# Patient Record
Sex: FEMALE | Race: WHITE | NOT HISPANIC OR LATINO | Employment: OTHER | URBAN - METROPOLITAN AREA
[De-identification: names, ages, dates, MRNs, and addresses within clinical notes are randomized per-mention and may not be internally consistent; named-entity substitution may affect disease eponyms.]

---

## 2017-02-06 ENCOUNTER — OFFICE VISIT (OUTPATIENT)
Dept: INTERNAL MEDICINE | Facility: CLINIC | Age: 82
End: 2017-02-06

## 2017-02-06 VITALS
HEIGHT: 61 IN | BODY MASS INDEX: 28.51 KG/M2 | SYSTOLIC BLOOD PRESSURE: 174 MMHG | RESPIRATION RATE: 16 BRPM | WEIGHT: 151 LBS | DIASTOLIC BLOOD PRESSURE: 98 MMHG | HEART RATE: 72 BPM | TEMPERATURE: 97.4 F

## 2017-02-06 DIAGNOSIS — F41.9 ANXIETY: ICD-10-CM

## 2017-02-06 DIAGNOSIS — R41.3 MEMORY LOSS, SHORT TERM: Primary | ICD-10-CM

## 2017-02-06 DIAGNOSIS — R20.0 NUMBNESS IN BOTH HANDS: ICD-10-CM

## 2017-02-06 DIAGNOSIS — K59.01 SLOW TRANSIT CONSTIPATION: ICD-10-CM

## 2017-02-06 PROCEDURE — 99214 OFFICE O/P EST MOD 30 MIN: CPT | Performed by: FAMILY MEDICINE

## 2017-02-06 PROCEDURE — G0439 PPPS, SUBSEQ VISIT: HCPCS | Performed by: FAMILY MEDICINE

## 2017-02-06 RX ORDER — DIAZEPAM 5 MG/1
5 TABLET ORAL EVERY 12 HOURS PRN
Qty: 60 TABLET | Refills: 0 | Status: SHIPPED | OUTPATIENT
Start: 2017-02-06 | End: 2017-05-02 | Stop reason: SDUPTHER

## 2017-02-06 RX ORDER — POVIDONE/POLYVINYL ALCOHOL 20; 27 G/1000ML; G/1000ML
SOLUTION/ DROPS OPHTHALMIC
Refills: 5 | COMMUNITY
Start: 2016-11-15 | End: 2019-10-30

## 2017-02-06 RX ORDER — ASPIRIN 81 MG/1
81 TABLET ORAL
COMMUNITY
End: 2019-10-30

## 2017-02-06 RX ORDER — POLYETHYLENE GLYCOL 3350 17 G/17G
17 POWDER, FOR SOLUTION ORAL DAILY
Qty: 500 G | Refills: 11 | Status: SHIPPED | OUTPATIENT
Start: 2017-02-06 | End: 2018-02-16 | Stop reason: SDUPTHER

## 2017-02-06 RX ORDER — POLYETHYLENE GLYCOL 3350 17 G/17G
POWDER, FOR SOLUTION ORAL
COMMUNITY
Start: 2016-11-17 | End: 2017-02-06 | Stop reason: SDUPTHER

## 2017-02-06 NOTE — PATIENT INSTRUCTIONS
Medicare Wellness  Personal Prevention Plan of Service     Date of Office Visit:  2017  Encounter Provider:  Prieto Medina Jr., MD  Place of Service:  [facility] INTERNAL MEDIC[facility]  Patient Name: Sho Cole  :  9/15/1922    As part of the Medicare Wellness portion of your visit today, we are providing you with this personalized preventive plan of services (PPPS). This plan is based upon recommendations of the [place] Preventive Services Task Force (USPSTF) and the Advisory Committee on Immunization Practices (ACIP).    This lists the preventive care services that should be considered, and provides dates of when you are due. Items listed as completed are up-to-date and do not require any further intervention.    Health Maintenance   Topic Date Due   • TDAP/TD VACCINES (1 - Tdap) 09/15/1941   • PNEUMOCOCCAL VACCINES (65+ LOW/MEDIUM RISK) (1 of 2 - PCV13) 09/15/1987   • INFLUENZA VACCINE  2016   • ZOSTER VACCINE  2017             Discussion    AWV.  See scanned forms for chun history, PHQ-9, functional ability questionnaire, cognitive impairment screening.  These were all reviewed with the patient and the patient was provided with a personal prevention plan of service in patient instructions.      Health Maintenance   Topic Date Due   • TDAP/TD VACCINES (1 - Tdap) 09[birth year]15/1941   • PNEUMOCOCCAL VACCINES (65+ LOW/MEDIUM RISK) (1 of 2 - PCV13) 09/15/1987   • INFLUENZA VACCINE  2016   • ZOSTER VACCINE  2017       PHQ-9 Depression Screening 2017   Little interest or pleasure in doing things 0   Feeling down, depressed, or hopeless 0   Trouble falling or staying asleep, or sleeping too much 0   Feeling tired or having little energy 0   Poor appetite or overeating 0   Feeling bad about yourself - or that you are a failure or have let yourself or your family down 0   Trouble concentrating on things, such as reading the newspaper or watching television 0   Moving  or speaking so slowly that other people could have noticed. Or the opposite - being so fidgety or restless that you have been moving around a lot more than usual 0   Thoughts that you would be better off dead, or of hurting yourself in some way 0   PHQ-9 Total Score 0   If you checked off any problems, how difficult have these problems made it for you to do your work, take care of things at home, or get along with other people? Not difficult at all

## 2017-02-06 NOTE — PROGRESS NOTES
Subjective     Sho Cole is a 89 y.o. female who presents for an annual wellness visit as well as check up of Memory loss short term, constipation, numbness in the arms.  Also anxiety.      History of Present Illness   Patient is seen here for Medicare wellness physical in 2*primary care follow-up.  She takes occasional diazepam 5 mg every 12 when necessary but not every day.  She is 89 years old with not much medical history.  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  She takes an 81 mg aspirin daily.  We discussed numbness of the arms occurs occasionally but not every day.  He appears to be postural.  She does not have any numbness of the legs or any weakness in extremities and she still plays golf.    She has some functional constipation.  MiraLAX.  Is effective.    Review of Systems   Constitutional: Negative.    HENT: Negative.    Eyes: Negative.    Respiratory: Negative.    Cardiovascular: Negative.    Gastrointestinal: Negative.    Endocrine: Negative.    Genitourinary: Negative.    Musculoskeletal: Negative.    Skin: Negative.    Allergic/Immunologic: Negative.    Neurological: Positive for numbness.   Hematological: Negative.    Psychiatric/Behavioral: Negative.        The following portions of the patient's history were reviewed and updated as appropriate: allergies, current medications, past family history, past medical history, past social history, past surgical history and problem list.     There are no active problems to display for this patient.      No past medical history on file.    No past surgical history on file.    No family history on file.    Social History     Social History   • Marital status:      Spouse name: N/A   • Number of children: N/A   • Years of education: N/A     Occupational History   • Not on file.     Social History Main Topics   • Smoking status: Not on file   • Smokeless  "tobacco: Not on file   • Alcohol use Not on file   • Drug use: Not on file   • Sexual activity: Not on file     Other Topics Concern   • Not on file     Social History Narrative       No current outpatient prescriptions on file prior to visit.     No current facility-administered medications on file prior to visit.        No Known Allergies      There is no immunization history on file for this patient.    Objective     Visit Vitals   • /98 (BP Location: Left arm, Patient Position: Sitting, Cuff Size: Adult)   • Pulse 72   • Temp 97.4 °F (36.3 °C) (Tympanic)   • Resp 16   • Ht 61\" (154.9 cm)   • Wt 151 lb (68.5 kg)   • BMI 28.53 kg/m2       Physical Exam   Constitutional: She is oriented to person, place, and time. She appears well-developed and well-nourished.   HENT:   Head: Normocephalic and atraumatic.   Right Ear: Tympanic membrane and external ear normal.   Left Ear: Tympanic membrane and external ear normal.   Nose: Nose normal.   Mouth/Throat: Oropharynx is clear and moist.   Eyes: Conjunctivae and EOM are normal. Pupils are equal, round, and reactive to light.   Neck: Normal range of motion. Neck supple. No JVD present. No thyromegaly present.   Cardiovascular: Normal rate, regular rhythm, normal heart sounds and intact distal pulses.    Pulmonary/Chest: Effort normal and breath sounds normal.   Abdominal: Soft. Bowel sounds are normal.   Musculoskeletal: Normal range of motion.   Lymphadenopathy:     She has no cervical adenopathy.   Neurological: She is alert and oriented to person, place, and time. No cranial nerve deficit. Coordination normal.   Skin: Skin is warm and dry. No rash noted.   Psychiatric: She has a normal mood and affect. Her behavior is normal. Judgment and thought content normal.   Vitals reviewed.      Assessment/Plan   Sho was seen today for anxiety, memory loss and gi problem.    Diagnoses and all orders for this visit:    Memory loss, short term    Slow transit " constipation    Anxiety    Numbness in both hands    Other orders  -     diazePAM (VALIUM) 5 MG tablet; Take 1 tablet by mouth Every 12 (Twelve) Hours As Needed for anxiety.  -     polyethylene glycol (MIRALAX) powder; Take 17 g by mouth Daily.        Discussion refill Valium 5 mg every 12 when necessary #60 no refill taken sparingly.  Warned about drowsiness.  Refill MiraLAX 17 g daily for functional constipation.  Recheck in 6-12 months.  She does not wish to labs done today.  Discussion about numbness in the arms as far as work up.  Given the infrequency of the numbness should like to wait on the workup.    AWV.  See scanned forms for chun history, PHQ-9, functional ability questionnaire, cognitive impairment screening.  These were all reviewed with the patient and the patient was provided with a personal prevention plan of service in patient instructions.      Health Maintenance   Topic Date Due   • TDAP/TD VACCINES (1 - Tdap) 09/15/1941   • PNEUMOCOCCAL VACCINES (65+ LOW/MEDIUM RISK) (1 of 2 - PCV13) 09/15/1987   • INFLUENZA VACCINE  08/01/2016   • ZOSTER VACCINE  02/06/2017       PHQ-9 Depression Screening 2/6/2017   Little interest or pleasure in doing things 0   Feeling down, depressed, or hopeless 0   Trouble falling or staying asleep, or sleeping too much 0   Feeling tired or having little energy 0   Poor appetite or overeating 0   Feeling bad about yourself - or that you are a failure or have let yourself or your family down 0   Trouble concentrating on things, such as reading the newspaper or watching television 0   Moving or speaking so slowly that other people could have noticed. Or the opposite - being so fidgety or restless that you have been moving around a lot more than usual 0   Thoughts that you would be better off dead, or of hurting yourself in some way 0   PHQ-9 Total Score 0   If you checked off any problems, how difficult have these problems made it for you to do your work, take care of  things at home, or get along with other people? Not difficult at all            No future appointments.

## 2017-02-06 NOTE — PROGRESS NOTES
Subjective   Sho Cole is a 89 y.o. female.     Chief Complaint   Patient presents with   • Anxiety   • Memory Loss   • GI Problem         History of Present Illness   Patient is seen here for Medicare wellness physical in 2*primary care follow-up.  She takes occasional diazepam 5 mg every 12 when necessary but not every day.  She is 89 years old with not much medical history.  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  She takes an 81 mg aspirin daily.  We discussed numbness of the arms occurs occasionally but not every day.  He appears to be postural.  She does not have any numbness of the legs or any weakness in extremities and she still plays golf.    She has some functional constipation.  MiraLAX.  Is effective.      The following portions of the patient's history were reviewed and updated as appropriate: allergies, current medications, past social history and problem list.    Review of Systems   Constitutional: Negative.    HENT: Negative.    Eyes: Negative.    Respiratory: Negative.    Cardiovascular: Negative.    Gastrointestinal: Positive for constipation.   Endocrine: Negative.    Genitourinary: Negative.    Musculoskeletal: Negative.    Skin: Negative.    Allergic/Immunologic: Negative.    Neurological: Positive for numbness.   Hematological: Negative.    Psychiatric/Behavioral: Negative.        Objective   Vitals:    02/06/17 1119   BP: 174/98   Pulse: 72   Resp: 16   Temp: 97.4 °F (36.3 °C)     Physical Exam    Assessment/Plan   Problem List Items Addressed This Visit        Digestive    Constipation       Other    Anxiety      Other Visit Diagnoses     Memory loss, short term    -  Primary    Numbness in both hands          Discussion refill Valium 5 mg every 12 when necessary #60 no refill taken sparingly.  Warned about drowsiness.  Refill MiraLAX 17 g daily for functional constipation.  Recheck in 6-12  months.  She does not wish to labs done today.  Discussion about numbness in the arms as far as work up.  Given the infrequency of the numbness should like to wait on the workup.

## 2017-05-02 RX ORDER — DIAZEPAM 5 MG/1
TABLET ORAL
Qty: 60 TABLET | Refills: 5 | Status: SHIPPED | OUTPATIENT
Start: 2017-05-02 | End: 2017-12-01 | Stop reason: SDUPTHER

## 2017-11-30 ENCOUNTER — OFFICE VISIT (OUTPATIENT)
Dept: INTERNAL MEDICINE | Facility: CLINIC | Age: 82
End: 2017-11-30

## 2017-11-30 VITALS
DIASTOLIC BLOOD PRESSURE: 90 MMHG | OXYGEN SATURATION: 94 % | HEART RATE: 84 BPM | WEIGHT: 162 LBS | TEMPERATURE: 98 F | HEIGHT: 61 IN | RESPIRATION RATE: 16 BRPM | SYSTOLIC BLOOD PRESSURE: 154 MMHG | BODY MASS INDEX: 30.58 KG/M2

## 2017-11-30 DIAGNOSIS — K59.01 SLOW TRANSIT CONSTIPATION: Primary | ICD-10-CM

## 2017-11-30 PROCEDURE — 99213 OFFICE O/P EST LOW 20 MIN: CPT | Performed by: NURSE PRACTITIONER

## 2017-11-30 RX ORDER — ALUMINUM ZIRCONIUM OCTACHLOROHYDREX GLY 16 G/100G
1 GEL TOPICAL DAILY
Qty: 90 CAPSULE | Refills: 1 | Status: SHIPPED | OUTPATIENT
Start: 2017-11-30 | End: 2018-11-13 | Stop reason: SDUPTHER

## 2017-11-30 NOTE — PROGRESS NOTES
"Vitals:    11/30/17 1107   BP: 154/90   Pulse: 84   Resp: 16   Temp: 98 °F (36.7 °C)   SpO2: 94%     Last 2 weights    11/30/17  1107   Weight: 162 lb (73.5 kg)     Social History   Substance Use Topics   • Smoking status: Not on file   • Smokeless tobacco: Not on file   • Alcohol use Not on file       Subjective     HPI  Pt presents to office today with ongoing chronic issues with constipation. She currently takes miralax daily for 20 years which has typically always helped but she has noticed an increasing with straining and feeling \"full\" in her abdomen area. Pt hve bm every day but thin, moreno dots formed together and needing to strain more.  Pt does drink about 3 bottles of water a day (16.9 oz bottles). She denies fever, abdominal pain/cramping, diarrhea, blood in stool, n/v. She just feels as if her clothes are tighter in her mid section than normal.     The following portions of the patient's history were reviewed and updated as appropriate: allergies, current medications, past medical history, past social history and problem list.    Review of Systems   Constitutional: Negative.    Respiratory: Negative.    Cardiovascular: Negative.    Gastrointestinal: Positive for constipation.       Objective     Physical Exam   Constitutional: She is oriented to person, place, and time. Vital signs are normal. She appears well-developed and well-nourished.   HENT:   Head: Normocephalic and atraumatic.   Neck: Normal range of motion.   Cardiovascular: Normal rate, regular rhythm and normal heart sounds.    Pulmonary/Chest: Effort normal and breath sounds normal.   Abdominal: Soft. Normal appearance and bowel sounds are normal. There is no hepatosplenomegaly. There is no tenderness. There is no tenderness at McBurney's point and negative Chatman's sign. No hernia.   Musculoskeletal: Normal range of motion.   Neurological: She is oriented to person, place, and time.   Nursing note and vitals reviewed.      Assessment/Plan "   Sho was seen today for other.    Diagnoses and all orders for this visit:    Slow transit constipation    Other orders  -     Probiotic Product (ALIGN) capsule; Take 1 capsule by mouth Daily.  -     psyllium (METAMUCIL) 58.6 % packet; Take 1 packet by mouth Daily.         -pt would like to start with dietary changes and adding probiotic and metamucil to see if this will help ease her bm  -if no changes in 1 week then we can look into lab work (she didn't want labs today) and possible x-ray to confirm likely constipation  -also mentioned amitiza/linzess for alternative treatment for chronic constipation. Pt aware and said she will look more into it  -cont home meds  -FU prn or if symptoms persist/worsen

## 2017-11-30 NOTE — PATIENT INSTRUCTIONS
-look up amitiza or linzess for chronic constipation to see if you are interested in this if this plan does not work. (probiotic, metamucil, miralax)

## 2017-12-04 RX ORDER — DIAZEPAM 5 MG/1
TABLET ORAL
Qty: 60 TABLET | Refills: 5 | Status: SHIPPED | OUTPATIENT
Start: 2017-12-04 | End: 2018-07-27 | Stop reason: SDUPTHER

## 2018-01-16 ENCOUNTER — APPOINTMENT (OUTPATIENT)
Dept: GENERAL RADIOLOGY | Facility: HOSPITAL | Age: 83
End: 2018-01-16

## 2018-01-16 ENCOUNTER — HOSPITAL ENCOUNTER (EMERGENCY)
Facility: HOSPITAL | Age: 83
Discharge: HOME | End: 2018-01-16
Attending: EMERGENCY MEDICINE | Admitting: EMERGENCY MEDICINE

## 2018-01-16 ENCOUNTER — APPOINTMENT (OUTPATIENT)
Dept: CT IMAGING | Facility: HOSPITAL | Age: 83
End: 2018-01-16

## 2018-01-16 VITALS
TEMPERATURE: 97.4 F | HEIGHT: 61 IN | BODY MASS INDEX: 21.71 KG/M2 | HEART RATE: 74 BPM | DIASTOLIC BLOOD PRESSURE: 79 MMHG | WEIGHT: 115 LBS | RESPIRATION RATE: 16 BRPM | SYSTOLIC BLOOD PRESSURE: 174 MMHG | OXYGEN SATURATION: 95 %

## 2018-01-16 DIAGNOSIS — K59.00 CONSTIPATION, UNSPECIFIED CONSTIPATION TYPE: Primary | ICD-10-CM

## 2018-01-16 LAB
ANION GAP SERPL CALCULATED.3IONS-SCNC: 11.7 MMOL/L
BACTERIA UR QL AUTO: ABNORMAL /HPF
BASOPHILS # BLD AUTO: 0.06 10*3/MM3 (ref 0–0.2)
BASOPHILS NFR BLD AUTO: 0.8 % (ref 0–1.5)
BILIRUB UR QL STRIP: NEGATIVE
BUN BLD-MCNC: 12 MG/DL (ref 8–23)
BUN/CREAT SERPL: 13.2 (ref 7–25)
CALCIUM SPEC-SCNC: 9.2 MG/DL (ref 8.2–9.6)
CHLORIDE SERPL-SCNC: 101 MMOL/L (ref 98–107)
CLARITY UR: CLEAR
CO2 SERPL-SCNC: 28.3 MMOL/L (ref 22–29)
COLOR UR: YELLOW
CREAT BLD-MCNC: 0.91 MG/DL (ref 0.57–1)
DEPRECATED RDW RBC AUTO: 46.3 FL (ref 37–54)
EOSINOPHIL # BLD AUTO: 0.15 10*3/MM3 (ref 0–0.7)
EOSINOPHIL NFR BLD AUTO: 2.1 % (ref 0.3–6.2)
ERYTHROCYTE [DISTWIDTH] IN BLOOD BY AUTOMATED COUNT: 13.3 % (ref 11.7–13)
GFR SERPL CREATININE-BSD FRML MDRD: 57 ML/MIN/1.73
GLUCOSE BLD-MCNC: 94 MG/DL (ref 65–99)
GLUCOSE UR STRIP-MCNC: NEGATIVE MG/DL
HCT VFR BLD AUTO: 42.6 % (ref 35.6–45.5)
HGB BLD-MCNC: 14 G/DL (ref 11.9–15.5)
HGB UR QL STRIP.AUTO: NEGATIVE
HYALINE CASTS UR QL AUTO: ABNORMAL /LPF
IMM GRANULOCYTES # BLD: 0 10*3/MM3 (ref 0–0.03)
IMM GRANULOCYTES NFR BLD: 0 % (ref 0–0.5)
KETONES UR QL STRIP: NEGATIVE
LEUKOCYTE ESTERASE UR QL STRIP.AUTO: ABNORMAL
LYMPHOCYTES # BLD AUTO: 2.22 10*3/MM3 (ref 0.9–4.8)
LYMPHOCYTES NFR BLD AUTO: 30.7 % (ref 19.6–45.3)
MCH RBC QN AUTO: 31 PG (ref 26.9–32)
MCHC RBC AUTO-ENTMCNC: 32.9 G/DL (ref 32.4–36.3)
MCV RBC AUTO: 94.2 FL (ref 80.5–98.2)
MONOCYTES # BLD AUTO: 0.63 10*3/MM3 (ref 0.2–1.2)
MONOCYTES NFR BLD AUTO: 8.7 % (ref 5–12)
NEUTROPHILS # BLD AUTO: 4.17 10*3/MM3 (ref 1.9–8.1)
NEUTROPHILS NFR BLD AUTO: 57.7 % (ref 42.7–76)
NITRITE UR QL STRIP: NEGATIVE
PH UR STRIP.AUTO: 8 [PH] (ref 5–8)
PLATELET # BLD AUTO: 236 10*3/MM3 (ref 140–500)
PMV BLD AUTO: 11 FL (ref 6–12)
POTASSIUM BLD-SCNC: 4 MMOL/L (ref 3.5–5.2)
PROT UR QL STRIP: NEGATIVE
RBC # BLD AUTO: 4.52 10*6/MM3 (ref 3.9–5.2)
RBC # UR: ABNORMAL /HPF
REF LAB TEST METHOD: ABNORMAL
SODIUM BLD-SCNC: 141 MMOL/L (ref 136–145)
SP GR UR STRIP: <1.005 (ref 1–1.03)
SQUAMOUS #/AREA URNS HPF: ABNORMAL /HPF
UROBILINOGEN UR QL STRIP: ABNORMAL
WBC NRBC COR # BLD: 7.23 10*3/MM3 (ref 4.5–10.7)
WBC UR QL AUTO: ABNORMAL /HPF

## 2018-01-16 PROCEDURE — 96360 HYDRATION IV INFUSION INIT: CPT

## 2018-01-16 PROCEDURE — 0 IOPAMIDOL 61 % SOLUTION: Performed by: EMERGENCY MEDICINE

## 2018-01-16 PROCEDURE — 99284 EMERGENCY DEPT VISIT MOD MDM: CPT

## 2018-01-16 PROCEDURE — 74018 RADEX ABDOMEN 1 VIEW: CPT

## 2018-01-16 PROCEDURE — 85025 COMPLETE CBC W/AUTO DIFF WBC: CPT | Performed by: NURSE PRACTITIONER

## 2018-01-16 PROCEDURE — 81001 URINALYSIS AUTO W/SCOPE: CPT | Performed by: NURSE PRACTITIONER

## 2018-01-16 PROCEDURE — 80048 BASIC METABOLIC PNL TOTAL CA: CPT | Performed by: NURSE PRACTITIONER

## 2018-01-16 PROCEDURE — 74177 CT ABD & PELVIS W/CONTRAST: CPT

## 2018-01-16 RX ORDER — SODIUM CHLORIDE 0.9 % (FLUSH) 0.9 %
10 SYRINGE (ML) INJECTION AS NEEDED
Status: DISCONTINUED | OUTPATIENT
Start: 2018-01-16 | End: 2018-01-16 | Stop reason: HOSPADM

## 2018-01-16 RX ORDER — LACTULOSE 10 G/15ML
20 SOLUTION ORAL ONCE
Status: COMPLETED | OUTPATIENT
Start: 2018-01-16 | End: 2018-01-16

## 2018-01-16 RX ADMIN — IOPAMIDOL 85 ML: 612 INJECTION, SOLUTION INTRAVENOUS at 15:38

## 2018-01-16 RX ADMIN — SODIUM CHLORIDE 500 ML: 9 INJECTION, SOLUTION INTRAVENOUS at 15:55

## 2018-01-16 RX ADMIN — MAGNESIUM HYDROXIDE 10 ML: 2400 SUSPENSION ORAL at 17:49

## 2018-01-16 RX ADMIN — MINERAL OIL 200 ML: 471.95 OIL ORAL at 17:50

## 2018-01-16 RX ADMIN — LACTULOSE 20 G: 10 SOLUTION ORAL at 17:48

## 2018-01-16 NOTE — ED PROVIDER NOTES
Pt presents to the ED with chronic constipation.  On exam, Pt is alert, awake, and oriented. Pt is in no distress. Neurologically intact.      Discussed the plan to further assess with CT scan. Pt understands and agrees with the plan, all questions answered.    I supervised care provided by the midlevel provider.    We have discussed this patient's history, physical exam, and treatment plan.   I have reviewed the note and personally saw and examined the patient and agree with the plan of care.    Documentation assistance provided by marlen Das for Dr. Nolan.  Information recorded by the scribe was done at my direction and has been verified and validated by me.       Hilda Das  01/16/18 [id number]       Jimbo Nolan MD  01/16/18 [id number]

## 2018-01-16 NOTE — ED PROVIDER NOTES
"EMERGENCY DEPARTMENT ENCOUNTER    Room Number:  05/05  Date seen:  1/16/2018  Time seen: 1:32 PM  PCP: Prieto Medina Jr., MD    HPI:  Chief complaint:Constipation  Context:Sho Cole is a 89 y.o. female who presents to the ED with c/o constipation for several days. She reports that stools vary from \"chunks to straw-like and occasionally soft\" but are never normally formed. Her last BM was this morning and she describes it as 6in in size and the shape of cigarette. Patient also c/o abdominal distention and gain of 10lb in 6 weeks, but there are no c/o abdominal pain, difficulty urinating, SOA, chest pain, gastric reflux, decreased appetite or other sx at this time. She was seen by Svitlana COBURN for Dr. Medina on 11/30/17 and was prescribed dietary changes, probiotics and metamucil. Patient has never seen a GI specialist or had a colonoscopy.     Timing:constant  Duration: several days  Quality:stools vary from \"chunks to straw-like and occasionally soft\"   Intensity/Severity:moderate  Associated Symptoms:abdominal distention, gain of 10lb in 6 weeks  Aggravating Factors:none stated  Alleviating Factors:none stated  Previous Episodes: none stated  Treatment before arrival:patient has been taking probiotics and metamucil with no resolve      ALLERGIES  Review of patient's allergies indicates no known allergies.    PAST MEDICAL HISTORY  Active Ambulatory Problems     Diagnosis Date Noted   • Anxiety 06/11/2013   • Constipation 06/11/2013     Resolved Ambulatory Problems     Diagnosis Date Noted   • No Resolved Ambulatory Problems     Past Medical History:   Diagnosis Date   • Hypertension        PAST SURGICAL HISTORY  Past Surgical History:   Procedure Laterality Date   • HYSTERECTOMY     • OOPHORECTOMY     • TONSILLECTOMY         FAMILY HISTORY  History reviewed. No pertinent family history.    SOCIAL HISTORY  Social History     Social History   • Marital status:      Spouse name: N/A   • Number of " children: N/A   • Years of education: N/A     Occupational History   • Not on file.     Social History Main Topics   • Smoking status: Former Smoker   • Smokeless tobacco: Never Used   • Alcohol use No   • Drug use: No   • Sexual activity: Not on file     Other Topics Concern   • Not on file     Social History Narrative   • No narrative on file       REVIEW OF SYSTEMS  Review of Systems   Constitutional: Positive for unexpected weight change (gain 10lb in 6 weeks). Negative for activity change, appetite change, diaphoresis and fever.   HENT: Negative for trouble swallowing.    Eyes: Negative for visual disturbance.   Respiratory: Negative for cough, chest tightness, shortness of breath and wheezing.    Cardiovascular: Negative for chest pain, palpitations and leg swelling.   Gastrointestinal: Positive for abdominal distention and constipation. Negative for abdominal pain, diarrhea, nausea and vomiting.   Genitourinary: Negative for dysuria.   Musculoskeletal: Negative for back pain.   Skin: Negative for rash.   Neurological: Negative for dizziness, speech difficulty and light-headedness.       PHYSICAL EXAM  ED Triage Vitals   Temp Heart Rate Resp BP SpO2   01/16/18 1146 01/16/18 1136 01/16/18 1136 01/16/18 1146 01/16/18 1136   97.4 °F (36.3 °C) 84 16 193/79 92 %      Temp src Heart Rate Source Patient Position BP Location FiO2 (%)   01/16/18 1146 01/16/18 1136 -- -- --   Temporal Art Monitor        Physical Exam   Constitutional: She is oriented to person, place, and time and well-developed, well-nourished, and in no distress. No distress.   HENT:   Head: Normocephalic and atraumatic.   Mouth/Throat: Uvula is midline and mucous membranes are normal.   Neck: Normal range of motion. Neck supple.   Cardiovascular: S1 normal, S2 normal and normal heart sounds.  Exam reveals no gallop and no friction rub.    No murmur heard.  Pulmonary/Chest: Effort normal. She has no decreased breath sounds. She has wheezes  (expiratory, left upper lobe posteriorly). She has no rhonchi. She has no rales.   Abdominal: Soft. Normal appearance. She exhibits distension (rounded). Bowel sounds are hyperactive. There is no rebound and no guarding.   Musculoskeletal: Normal range of motion.   Neurological: She is alert and oriented to person, place, and time.   Skin: Skin is warm, dry and intact.   Psychiatric: Affect and judgment normal.   Nursing note and vitals reviewed.      LAB RESULTS  Recent Results (from the past 24 hour(s))   Urinalysis With / Culture If Indicated - Urine, Clean Catch    Collection Time: 01/16/18  2:16 PM   Result Value Ref Range    Color, UA Yellow Yellow, Straw    Appearance, UA Clear Clear    pH, UA 8.0 5.0 - 8.0    Specific Gravity, UA <1.005 (L) 1.005 - 1.030    Glucose, UA Negative Negative    Ketones, UA Negative Negative    Bilirubin, UA Negative Negative    Blood, UA Negative Negative    Protein, UA Negative Negative    Leuk Esterase, UA Small (1+) (A) Negative    Nitrite, UA Negative Negative    Urobilinogen, UA 0.2 E.U./dL 0.2 - 1.0 E.U./dL   Urinalysis, Microscopic Only - Urine, Clean Catch    Collection Time: 01/16/18  2:16 PM   Result Value Ref Range    RBC, UA 0-2 None Seen, 0-2 /HPF    WBC, UA 3-5 (A) None Seen, 0-2 /HPF    Bacteria, UA None Seen None Seen /HPF    Squamous Epithelial Cells, UA None Seen None Seen, 0-2 /HPF    Hyaline Casts, UA 3-6 None Seen /LPF    Methodology Manual Light Microscopy    Basic Metabolic Panel    Collection Time: 01/16/18  2:26 PM   Result Value Ref Range    Glucose 94 65 - 99 mg/dL    BUN 12 8 - 23 mg/dL    Creatinine 0.91 0.57 - 1.00 mg/dL    Sodium 141 136 - 145 mmol/L    Potassium 4.0 3.5 - 5.2 mmol/L    Chloride 101 98 - 107 mmol/L    CO2 28.3 22.0 - 29.0 mmol/L    Calcium 9.2 8.2 - 9.6 mg/dL    eGFR Non African Amer 57 (L) >60 mL/min/1.73    BUN/Creatinine Ratio 13.2 7.0 - 25.0    Anion Gap 11.7 mmol/L   CBC Auto Differential    Collection Time: 01/16/18  2:26 PM    Result Value Ref Range    WBC 7.23 4.50 - 10.70 10*3/mm3    RBC 4.52 3.90 - 5.20 10*6/mm3    Hemoglobin 14.0 11.9 - 15.5 g/dL    Hematocrit 42.6 35.6 - 45.5 %    MCV 94.2 80.5 - 98.2 fL    MCH 31.0 26.9 - 32.0 pg    MCHC 32.9 32.4 - 36.3 g/dL    RDW 13.3 (H) 11.7 - 13.0 %    RDW-SD 46.3 37.0 - 54.0 fl    MPV 11.0 6.0 - 12.0 fL    Platelets 236 140 - 500 10*3/mm3    Neutrophil % 57.7 42.7 - 76.0 %    Lymphocyte % 30.7 19.6 - 45.3 %    Monocyte % 8.7 5.0 - 12.0 %    Eosinophil % 2.1 0.3 - 6.2 %    Basophil % 0.8 0.0 - 1.5 %    Immature Grans % 0.0 0.0 - 0.5 %    Neutrophils, Absolute 4.17 1.90 - 8.10 10*3/mm3    Lymphocytes, Absolute 2.22 0.90 - 4.80 10*3/mm3    Monocytes, Absolute 0.63 0.20 - 1.20 10*3/mm3    Eosinophils, Absolute 0.15 0.00 - 0.70 10*3/mm3    Basophils, Absolute 0.06 0.00 - 0.20 10*3/mm3    Immature Grans, Absolute 0.00 0.00 - 0.03 10*3/mm3       I ordered the above labs and reviewed the results    RADIOLOGY  CT Abdomen Pelvis With Contrast   Final Result   Small hiatal hernia. Moderate colonic diverticulosis without   evidence of diverticulitis. Otherwise unremarkable CT scan of the   abdomen and pelvis. No acute process is identified.       This report was finalized on 1/16/2018 4:20 PM by Dr. Markell Rodríguez MD.          XR Abdomen KUB   Final Result   Mild-to-moderate stool throughout the colon consistent with   history of constipation.       This report was finalized on 1/16/2018 3:08 PM by Dr. Deven Das MD.              I ordered the above noted radiological studies and reviewed the images on the PACS system.  Spoke with Dr. Blankenship regarding CT/MRI scan results    MEDICATIONS GIVEN IN ER  Medications   sodium chloride 0.9 % flush 10 mL (not administered)   sodium chloride 0.9 % bolus 500 mL (500 mL Intravenous New Bag 1/16/18 4938)   magnesium hydroxide (MILK OF MAGNESIA) suspension 2400 mg/10mL 10 mL (not administered)   lactulose (CHRONULAC) 10 GM/15ML solution 20 g (not  "administered)   mineral oil-magnesium hydroxide-glycerin-sorbitol (MMGS) enema (not administered)   iopamidol (ISOVUE-300) 61 % injection 100 mL (85 mL Intravenous Given 1/16/18 1538)     PROCEDURES  Procedures    COURSE & MEDICAL DECISION MAKING  Pertinent Labs and Imaging studies that were ordered and reviewed are noted above.  Results were reviewed/discussed with the patient and they were also made aware of online assess.  Pt also made aware that some labs, such as cultures, will not be resulted during ER visit and follow up with PMD is necessary.     PROGRESS AND CONSULTS    Progress Notes:    ED Course     1527 Reviewed pt's history and workup with Dr. Nolan.  After a bedside evaluation; Dr Nolan agrees with the plan of care    1627 Rechecked patient and informed her of unremarkable workup. Plan to administer milk of magnesium and smog enema. Discussed plan to d/c with magnesium citrate. The patient's history, physical exam, and lab findings were discussed with the physician, who also performed a face to face history and physical exam.  I discussed all results and noted any abnormalities with patient.  Discussed absoute need to recheck abnormalities with their family physician.  I answered any of the patient's questions.  Discussed plan for discharge, as there is no emergent indication for admission.  Pt is agreeable and understands need for follow up and repeat testing.  Pt is aware that discharge does not mean that nothing is wrong but it indicates no emergency is present and they must continue care with their family physician.  Pt is discharged with instructions to follow up with primary care doctor to have their blood pressure rechecked.     Disposition vitals:  /79  Pulse 74  Temp 97.4 °F (36.3 °C) (Temporal Artery )   Resp 16  Ht 154.9 cm (61\")  Wt 52.2 kg (115 lb)  SpO2 95%  BMI 21.73 kg/m2      DIAGNOSIS  Final diagnoses:   Constipation, unspecified constipation type       FOLLOW UP "   Cosme Valdes MD  [address] [facility] [address]  [place] [id number]  [phone]    Schedule an appointment as soon as possible for a visit  If symptoms worsen    Prieto Medina Jr., MD  [address] [facility] [address]  [place] [id number]  [phone]    Schedule an appointment as soon as possible for a visit        RX     Medication List      New Prescriptions          magnesium citrate solution   Take 296 mL by mouth 1 (One) Time for 1 dose.         Changed          diazePAM 5 MG tablet   Commonly known as:  VALIUM   TAKE 1 TABLET BY MOUTH TWICE DAILY   What changed:  See the new instructions.           Documentation assistance provided by marlen Nova for Carrie Irwin, APRHALEY.  Information recorded by the marlen was done at my direction and has been verified and validated by me.  Electronically signed by Lisha Nova on 1/16/2018 at time 4:49 PM          Lisha Nova  01/16/18 3559       Carrie Irwin, APRN  01/19/18 0730

## 2018-01-16 NOTE — DISCHARGE INSTRUCTIONS
It is very important for you to consult Gastrointestinal Surgery to discuss colonoscopy    You need to also address your periods of high blood pressure.       Take Miralax daily, drink extra water. Eat fresh fruits and vegetables.    Return Precautions    Although you are being discharged from the ED today, I encourage you to return for worsening symptoms.  Things can, and do, change such that treatment at home with medication may not be adequate.      Specifically, return for any of the following:    Chest pain, shortness of breath, pain or nausea and vomiting not controlled by medications provided.    Please make a follow up with your Primary Care Provider for a blood pressure recheck.

## 2018-01-18 ENCOUNTER — TELEPHONE (OUTPATIENT)
Dept: SOCIAL WORK | Facility: HOSPITAL | Age: 83
End: 2018-01-18

## 2018-01-29 ENCOUNTER — OFFICE VISIT (OUTPATIENT)
Dept: GASTROENTEROLOGY | Facility: CLINIC | Age: 83
End: 2018-01-29

## 2018-01-29 ENCOUNTER — HOSPITAL ENCOUNTER (OUTPATIENT)
Facility: HOSPITAL | Age: 83
Setting detail: HOSPITAL OUTPATIENT SURGERY
End: 2018-01-29
Attending: INTERNAL MEDICINE | Admitting: INTERNAL MEDICINE

## 2018-01-29 VITALS
BODY MASS INDEX: 31.45 KG/M2 | WEIGHT: 160.2 LBS | DIASTOLIC BLOOD PRESSURE: 72 MMHG | SYSTOLIC BLOOD PRESSURE: 132 MMHG | HEIGHT: 60 IN | TEMPERATURE: 98.2 F

## 2018-01-29 DIAGNOSIS — K59.01 SLOW TRANSIT CONSTIPATION: Primary | ICD-10-CM

## 2018-01-29 DIAGNOSIS — R19.4 CHANGE IN BOWEL HABITS: ICD-10-CM

## 2018-01-29 PROCEDURE — 99204 OFFICE O/P NEW MOD 45 MIN: CPT | Performed by: INTERNAL MEDICINE

## 2018-01-29 NOTE — PROGRESS NOTES
Chief Complaint   Patient presents with   • Constipation       Sho Cole is a 89 y.o. female who presents with Worsening constipation, change in bowel habits, left-sided abdominal bloating, family history of colon cancer    HPI Comments: 89-year-old with chronic constipation worsening recently.  Emergency room visit revealed a normal CAT scan and routine blood testing.  No previous colonoscopy in her lifetime.  Family history of colon cancer in her brother.  No weight loss or rectal bleeding.  She is requiring fiber therapy and MiraLAX to induce bowel movements.  There is definitely been a change in the way her abdomen feels with more bloating.  She is concerned about colonic malignancy    Constipation   This is a chronic problem. The current episode started more than 1 year ago. The problem has been gradually worsening since onset. Her stool frequency is 1 time per day. The stool is described as formed, malodorous and pellet like. The patient is on a high fiber diet. She exercises regularly. There has been adequate water intake. Associated symptoms include abdominal pain, back pain, bloating and flatus. Pertinent negatives include no rectal pain, vomiting or weight loss. She has tried diet changes, fiber, laxatives and stool softeners for the symptoms. The treatment provided mild relief. There is no history of abdominal surgery, endocrine disease, inflammatory bowel disease, irritable bowel syndrome, metabolic disease, neurologic disease, neuromuscular disease, psychiatric history or radiation treatment.       Past Medical History:   Diagnosis Date   • Hypertension        Past Surgical History:   Procedure Laterality Date   • HYSTERECTOMY     • OOPHORECTOMY     • TONSILLECTOMY           Current Outpatient Prescriptions:   •  aspirin 81 MG EC tablet, Take 81 mg by mouth., Disp: , Rfl:   •  diazePAM (VALIUM) 5 MG tablet, TAKE 1 TABLET BY MOUTH TWICE DAILY (Patient taking differently: TAKE 1 TABLET BY MOUTH TWICE  DAILY prn), Disp: 60 tablet, Rfl: 5  •  FRESHKOTE 2.7-2 % solution, INSTILL ONE DROP IN OU TID, Disp: , Rfl: 5  •  Multiple Vitamins-Minerals (DAILY MULTIVITAMIN PO), Take  by mouth., Disp: , Rfl:   •  polyethylene glycol (MIRALAX) powder, Take 17 g by mouth Daily., Disp: 500 g, Rfl: 11  •  Probiotic Product (ALIGN) capsule, Take 1 capsule by mouth Daily., Disp: 90 capsule, Rfl: 1  •  psyllium (METAMUCIL) 58.6 % packet, Take 1 packet by mouth Daily., Disp: 54 packet, Rfl: 0  •  KRILL OIL PO, Take  by mouth., Disp: , Rfl:     No Known Allergies    Social History     Social History   • Marital status:      Spouse name: N/A   • Number of children: N/A   • Years of education: N/A     Occupational History   • Not on file.     Social History Main Topics   • Smoking status: Former Smoker   • Smokeless tobacco: Never Used   • Alcohol use No   • Drug use: No   • Sexual activity: Not on file     Other Topics Concern   • Not on file     Social History Narrative       History reviewed. No pertinent family history.    Review of Systems   Constitutional: Negative for weight loss.   Gastrointestinal: Positive for abdominal pain, bloating, constipation and flatus. Negative for rectal pain and vomiting.   Musculoskeletal: Positive for back pain.   All other systems reviewed and are negative.      Vitals:    01/29/18 1433   BP: 132/72   Temp: 98.2 °F (36.8 °C)       Physical Exam   Constitutional: She is oriented to person, place, and time. She appears well-developed and well-nourished.   HENT:   Head: Normocephalic and atraumatic.   Eyes: Pupils are equal, round, and reactive to light.   Cardiovascular: Normal rate, regular rhythm and normal heart sounds.    Pulmonary/Chest: Effort normal and breath sounds normal.   Abdominal: Soft. Bowel sounds are normal. She exhibits no shifting dullness, no distension, no pulsatile liver, no fluid wave, no abdominal bruit, no ascites, no pulsatile midline mass and no mass. There is no  hepatosplenomegaly. There is no tenderness. There is no rigidity and no guarding. No hernia.   Musculoskeletal: Normal range of motion.   Neurological: She is alert and oriented to person, place, and time.   Skin: Skin is warm and dry.   Psychiatric: She has a normal mood and affect. Her behavior is normal. Thought content normal.   Nursing note and vitals reviewed.    Problem list    Worsening constipation with no previous colonoscopy  Abdominal bloating with left sided abdominal change  Family history of colon cancer in her brother  Recent CT and CBC normal      Assessment/Plan    We Discussed colonoscopy to rule out malignancy, diverticulosis, stricture or polyps.  Even though she is 89 years old, she has no COPD, diabetes mellitus, history of coronary artery disease, I do think colonoscopy would be safe, and necessary to rule out the above diagnosis.  Her family is in agreement and she is in agreement.  We will move to colonoscopy    We will plan on starting Linzess 72 mg by mouth every morning in place of MiraLAX for chronic constipation if her colonoscopy is within normal limits.  She can continue probiotic and fiber therapy as scheduled.    Further recommendations after colonoscopy is complete

## 2018-01-30 ENCOUNTER — TELEPHONE (OUTPATIENT)
Dept: GASTROENTEROLOGY | Facility: CLINIC | Age: 83
End: 2018-01-30

## 2018-01-30 NOTE — TELEPHONE ENCOUNTER
----- Message from Hina Briones sent at 1/30/2018  1:52 PM EST -----  Regarding: PT RAMÓN BARRERA & SARAHY CALLED - QUESTIONS  Contact: [phone]  SARAHY  [phone].  PT SAW DR SILVER YESTERDAY. SHE IS CARL FOR C/S. PT HAD LOW BLOOD PRESSURE YESTERDAY READING . SHE HAS WHITE COAT SYNDROME WHICH CAUSES HER BLOOD PRESSURE TO GO HIGH. IS THIS A CONCERN? WHAT WOULD BE THE RISK OF A PUNCTURE IN THE THIN TISSUE DUE TO AGE OF PT? HOW WOULD SHE TOLERATE THE PREP? HOW MANY C/S HAS HE DONE ON THIS AGE PT AND WHAT WAS THE OUTCOME?

## 2018-01-30 NOTE — TELEPHONE ENCOUNTER
Returned phone call to daughter Jess, she states she and her sister Devika have several questions regarding patient's impending colonoscopy. She was concerned regarding the patient's b/p. States it has never been this low. Advised several factors could be the cause of the patient's lower b/p. Advised will update Dr. Medrano regarding their questions and concerns regarding the patient's colonoscopy.  She states she and her sister are just very concerned about the patient going through a colonoscopy at her age. She verb understanding.

## 2018-02-05 ENCOUNTER — OFFICE VISIT (OUTPATIENT)
Dept: INTERNAL MEDICINE | Facility: CLINIC | Age: 83
End: 2018-02-05

## 2018-02-05 VITALS
DIASTOLIC BLOOD PRESSURE: 82 MMHG | BODY MASS INDEX: 30.08 KG/M2 | OXYGEN SATURATION: 95 % | HEART RATE: 96 BPM | SYSTOLIC BLOOD PRESSURE: 160 MMHG | TEMPERATURE: 97.8 F | WEIGHT: 154 LBS

## 2018-02-05 DIAGNOSIS — R19.4 CHANGE IN BOWEL HABITS: ICD-10-CM

## 2018-02-05 DIAGNOSIS — K59.01 SLOW TRANSIT CONSTIPATION: Primary | ICD-10-CM

## 2018-02-05 PROCEDURE — 99213 OFFICE O/P EST LOW 20 MIN: CPT | Performed by: FAMILY MEDICINE

## 2018-02-05 RX ORDER — TRIAMCINOLONE ACETONIDE 0.25 MG/G
CREAM TOPICAL 2 TIMES DAILY
Qty: 80 G | Refills: 0 | Status: SHIPPED | OUTPATIENT
Start: 2018-02-05 | End: 2019-10-30

## 2018-02-05 RX ORDER — OCTISALATE, AVOBENZONE, HOMOSALATE, AND OCTOCRYLENE 29.4; 29.4; 49; 25.48 MG/ML; MG/ML; MG/ML; MG/ML
4 LOTION TOPICAL 2 TIMES DAILY
Qty: 60 CAPSULE | Refills: 5 | Status: SHIPPED | OUTPATIENT
Start: 2018-02-05 | End: 2019-10-30

## 2018-02-05 RX ORDER — LUBIPROSTONE 8 UG/1
8 CAPSULE ORAL 2 TIMES DAILY WITH MEALS
Qty: 60 CAPSULE | Refills: 2 | Status: SHIPPED | OUTPATIENT
Start: 2018-02-05 | End: 2018-11-13

## 2018-02-05 NOTE — PROGRESS NOTES
Andi Cole is a 89 y.o. female.     Chief Complaint   Patient presents with   • GI Problem         History of Present Illness   Patient is seen after ER follow-up for obstipation.  She required an enema.  She is trying MiraLAX which is done for years.  Added Metamucil but she can't stand to take it because it difficult to swallow.  CT scan of the abdomen and also KUB x-rays reviewed showing no evidence of obvious pathology.  This appears to be slow transit constipation.  This is discussed with her and her daughter about the causes.      The following portions of the patient's history were reviewed and updated as appropriate: allergies, current medications, past social history and problem list.    Review of Systems   Constitutional: Negative.    HENT: Negative.    Eyes: Negative.    Respiratory: Negative.    Cardiovascular: Negative.    Gastrointestinal: Negative.    Endocrine: Negative.    Genitourinary: Negative.    Musculoskeletal: Negative.    Skin: Negative.    Allergic/Immunologic: Negative.    Neurological: Negative.    Hematological: Negative.    Psychiatric/Behavioral: Negative.        Objective   Vitals:    02/05/18 1455   BP: 160/82   Pulse: 96   Temp: 97.8 °F (36.6 °C)   SpO2: 95%     Physical Exam   Constitutional: She is oriented to person, place, and time. She appears well-developed and well-nourished.   HENT:   Head: Normocephalic and atraumatic.   Right Ear: Tympanic membrane and external ear normal.   Left Ear: Tympanic membrane and external ear normal.   Nose: Nose normal.   Mouth/Throat: Oropharynx is clear and moist.   Eyes: Conjunctivae and EOM are normal. Pupils are equal, round, and reactive to light.   Neck: Normal range of motion. Neck supple. No JVD present. No thyromegaly present.   Cardiovascular: Normal rate, regular rhythm, normal heart sounds and intact distal pulses.    Pulmonary/Chest: Effort normal and breath sounds normal.   Abdominal: Soft. Bowel sounds are  normal.   Musculoskeletal: Normal range of motion.   Lymphadenopathy:     She has no cervical adenopathy.   Neurological: She is alert and oriented to person, place, and time. No cranial nerve deficit. Coordination normal.   Skin: Skin is warm and dry. No rash noted.   Psychiatric: She has a normal mood and affect. Her behavior is normal. Judgment and thought content normal.   Vitals reviewed.      Assessment/Plan   Problem List Items Addressed This Visit        Digestive    Slow transit constipation - Primary    Change in bowel habits      Plan: Discontinue Metamucil continue MiraLAX trial of Amitiza 8 µg twice a day.  She has a follow-up appointment in the spring.  SHe'll let us know via phone how she is doing.

## 2018-02-16 RX ORDER — POLYETHYLENE GLYCOL 3350 17 G/17G
POWDER, FOR SOLUTION ORAL
Qty: 527 G | Refills: 1 | Status: SHIPPED | OUTPATIENT
Start: 2018-02-16 | End: 2018-04-02 | Stop reason: SDUPTHER

## 2018-04-02 RX ORDER — POLYETHYLENE GLYCOL 3350 17 G/17G
POWDER, FOR SOLUTION ORAL
Qty: 527 G | Refills: 0 | Status: SHIPPED | OUTPATIENT
Start: 2018-04-02 | End: 2018-05-02 | Stop reason: SDUPTHER

## 2018-05-02 RX ORDER — POLYETHYLENE GLYCOL 3350 17 G/17G
POWDER, FOR SOLUTION ORAL
Qty: 527 G | Refills: 0 | Status: SHIPPED | OUTPATIENT
Start: 2018-05-02 | End: 2018-07-16 | Stop reason: SDUPTHER

## 2018-06-21 RX ORDER — DIAZEPAM 5 MG/1
TABLET ORAL
Qty: 60 TABLET | Refills: 0 | OUTPATIENT
Start: 2018-06-21

## 2018-07-16 RX ORDER — POLYETHYLENE GLYCOL 3350 17 G/17G
POWDER, FOR SOLUTION ORAL
Qty: 527 G | Refills: 0 | Status: SHIPPED | OUTPATIENT
Start: 2018-07-16 | End: 2021-11-04 | Stop reason: SDUPTHER

## 2018-07-30 RX ORDER — DIAZEPAM 5 MG/1
TABLET ORAL
Qty: 60 TABLET | Refills: 0 | Status: SHIPPED | OUTPATIENT
Start: 2018-07-30 | End: 2018-10-26 | Stop reason: SDUPTHER

## 2018-08-30 RX ORDER — POLYETHYLENE GLYCOL 3350 17 G/17G
POWDER, FOR SOLUTION ORAL
Qty: 527 G | Refills: 0 | OUTPATIENT
Start: 2018-08-30

## 2018-10-30 RX ORDER — DIAZEPAM 5 MG/1
TABLET ORAL
Qty: 60 TABLET | Refills: 0 | Status: SHIPPED | OUTPATIENT
Start: 2018-10-30 | End: 2018-12-07 | Stop reason: SDUPTHER

## 2018-11-13 ENCOUNTER — OFFICE VISIT (OUTPATIENT)
Dept: INTERNAL MEDICINE | Facility: CLINIC | Age: 83
End: 2018-11-13

## 2018-11-13 VITALS
BODY MASS INDEX: 29.49 KG/M2 | SYSTOLIC BLOOD PRESSURE: 174 MMHG | HEART RATE: 84 BPM | OXYGEN SATURATION: 93 % | WEIGHT: 151 LBS | TEMPERATURE: 97.7 F | DIASTOLIC BLOOD PRESSURE: 96 MMHG

## 2018-11-13 DIAGNOSIS — K58.9 IRRITABLE BOWEL SYNDROME, UNSPECIFIED TYPE: ICD-10-CM

## 2018-11-13 DIAGNOSIS — K59.01 SLOW TRANSIT CONSTIPATION: Primary | ICD-10-CM

## 2018-11-13 PROCEDURE — 99213 OFFICE O/P EST LOW 20 MIN: CPT | Performed by: FAMILY MEDICINE

## 2018-11-13 NOTE — PROGRESS NOTES
Subjective   Sho Cole is a 89 y.o. female.     Chief Complaint   Patient presents with   • GI Problem         History of Present Illness   Patient is a 89-year-old lady is generally upset about her colon function.  She has had previous obstipation and takes MiraLAX every other day with what she describes as frequent loose bowel movements.  She does not have necessarily pain she feels like her colonoscopy, loose and become detached.  I've instructed her and she seems receptive to the idea that her colon was not detached but she does have diverticulosis in the left colon as well as a sigmoid colon.  She doesn't wish to take any antibiotics or extra medications at this time.  She seems satisfied with her present state and does not wish to have any intervention as far as labs or GI workup.  She will maintain MiraLAX every other day and let us know she is having problems.      The following portions of the patient's history were reviewed and updated as appropriate: allergies, current medications, past social history and problem list.    Review of Systems   Constitutional: Negative.    HENT: Negative.    Eyes: Negative.    Respiratory: Negative.    Cardiovascular: Negative.    Gastrointestinal: Positive for abdominal distention and diarrhea.   Endocrine: Negative.    Genitourinary: Negative.    Musculoskeletal: Negative.    Skin: Negative.    Allergic/Immunologic: Negative.    Neurological: Negative.    Hematological: Negative.    Psychiatric/Behavioral: Negative.        Objective   Vitals:    11/13/18 1325   BP: 174/96   Pulse: 84   Temp: 97.7 °F (36.5 °C)   SpO2: 93%     Physical Exam   Constitutional: She is oriented to person, place, and time. She appears well-developed and well-nourished.   HENT:   Head: Normocephalic and atraumatic.   Right Ear: Tympanic membrane and external ear normal.   Left Ear: Tympanic membrane and external ear normal.   Nose: Nose normal.   Mouth/Throat: Oropharynx is clear and moist.    Eyes: Conjunctivae and EOM are normal. Pupils are equal, round, and reactive to light.   Neck: Normal range of motion. Neck supple. No JVD present. No thyromegaly present.   Cardiovascular: Normal rate, regular rhythm, normal heart sounds and intact distal pulses.   Pulmonary/Chest: Effort normal and breath sounds normal.   Abdominal: Soft. Bowel sounds are normal.   Musculoskeletal: Normal range of motion.   Lymphadenopathy:     She has no cervical adenopathy.   Neurological: She is alert and oriented to person, place, and time. No cranial nerve deficit. Coordination normal.   Skin: Skin is warm and dry. No rash noted.        Psychiatric: She has a normal mood and affect. Her behavior is normal. Judgment and thought content normal.   Vitals reviewed.      Assessment/Plan   Problem List Items Addressed This Visit        Digestive    Slow transit constipation - Primary      Other Visit Diagnoses     Irritable bowel syndrome, unspecified type          Discussion about bowel habits and MiraLAX.  Recheck in here for Medicare wellness.  Continue MiraLAX every other day.  She is reassured that hemosiderin pigmentation over the arms and is not fecal material in the blood.

## 2018-12-07 RX ORDER — DIAZEPAM 5 MG/1
TABLET ORAL
Qty: 60 TABLET | Refills: 4 | Status: SHIPPED | OUTPATIENT
Start: 2018-12-07 | End: 2019-10-30 | Stop reason: DRUGHIGH

## 2019-10-21 RX ORDER — DIAZEPAM 5 MG/1
TABLET ORAL
Qty: 60 TABLET | Refills: 0 | OUTPATIENT
Start: 2019-10-21

## 2019-10-21 NOTE — TELEPHONE ENCOUNTER
Contract needs to be updated   Pt wants to wait til her appt time to renew her contract and get her RX refilled

## 2019-10-30 ENCOUNTER — OFFICE VISIT (OUTPATIENT)
Dept: INTERNAL MEDICINE | Facility: CLINIC | Age: 84
End: 2019-10-30

## 2019-10-30 VITALS
HEART RATE: 108 BPM | WEIGHT: 153 LBS | DIASTOLIC BLOOD PRESSURE: 98 MMHG | TEMPERATURE: 97.7 F | BODY MASS INDEX: 29.88 KG/M2 | SYSTOLIC BLOOD PRESSURE: 166 MMHG | OXYGEN SATURATION: 94 %

## 2019-10-30 DIAGNOSIS — K59.01 SLOW TRANSIT CONSTIPATION: ICD-10-CM

## 2019-10-30 DIAGNOSIS — F41.9 ANXIETY: Primary | ICD-10-CM

## 2019-10-30 DIAGNOSIS — Z00.00 MEDICARE ANNUAL WELLNESS VISIT, SUBSEQUENT: ICD-10-CM

## 2019-10-30 PROCEDURE — 99213 OFFICE O/P EST LOW 20 MIN: CPT | Performed by: FAMILY MEDICINE

## 2019-10-30 PROCEDURE — G0439 PPPS, SUBSEQ VISIT: HCPCS | Performed by: FAMILY MEDICINE

## 2019-10-30 RX ORDER — DIAZEPAM 2 MG/1
2 TABLET ORAL NIGHTLY PRN
Qty: 30 TABLET | Refills: 5 | Status: SHIPPED | OUTPATIENT
Start: 2019-10-30 | End: 2020-05-06

## 2019-10-30 NOTE — PROGRESS NOTES
The ABCs of the Annual Wellness Visit  Subsequent Medicare Wellness Visit    No chief complaint on file.      Subjective   History of Present Illness:  Sho Cole is a 89 y.o. female who presents for a Subsequent Medicare Wellness Visit.    HEALTH RISK ASSESSMENT    Recent Hospitalizations:  No hospitalization(s) within the last year.    Current Medical Providers:  Patient Care Team:  Prieto Medina Jr., MD as PCP - General (Family Medicine)    Smoking Status:  Social History     Tobacco Use   Smoking Status Former Smoker   Smokeless Tobacco Never Used       Alcohol Consumption:  Social History     Substance and Sexual Activity   Alcohol Use No       Depression Screen:   PHQ-2/PHQ-9 Depression Screening 10/30/2019   Little interest or pleasure in doing things 0   Feeling down, depressed, or hopeless 0   Trouble falling or staying asleep, or sleeping too much -   Feeling tired or having little energy -   Poor appetite or overeating -   Feeling bad about yourself - or that you are a failure or have let yourself or your family down -   Trouble concentrating on things, such as reading the newspaper or watching television -   Moving or speaking so slowly that other people could have noticed. Or the opposite - being so fidgety or restless that you have been moving around a lot more than usual -   Thoughts that you would be better off dead, or of hurting yourself in some way -   Total Score 0   If you checked off any problems, how difficult have these problems made it for you to do your work, take care of things at home, or get along with other people? -       Fall Risk Screen:  DAYAMIADI Fall Risk Assessment was completed, and patient is at HIGH risk for falls. Assessment completed on:10/30/2019    Health Habits and Functional and Cognitive Screening:  Functional & Cognitive Status 10/30/2019   Do you have difficulty preparing food and eating? No   Do you have difficulty bathing yourself, getting dressed or grooming  yourself? No   Do you have difficulty using the toilet? No   Do you have difficulty moving around from place to place? No   Do you have trouble with steps or getting out of a bed or a chair? No   Current Diet Well Balanced Diet   Dental Exam Up to date   Eye Exam Up to date   Exercise (times per week) 0 times per week   Current Exercise Activities Include None   Do you need help using the phone?  No   Are you deaf or do you have serious difficulty hearing?  Yes   Do you need help with transportation? No   Do you need help shopping? No   Do you need help preparing meals?  No   Do you need help with housework?  No   Do you need help with laundry? No   Do you need help taking your medications? No   Do you need help managing money? No   Do you ever drive or ride in a car without wearing a seat belt? No   Have you felt unusual stress, anger or loneliness in the last month? No   Who do you live with? Alone   If you need help, do you have trouble finding someone available to you? No   Have you been bothered in the last four weeks by sexual problems? No   Do you have difficulty concentrating, remembering or making decisions? No         Does the patient have evidence of cognitive impairment? Yes    Asprin use counseling:Does not need ASA (and currently is not on it)    Age-appropriate Screening Schedule:  Refer to the list below for future screening recommendations based on patient's age, sex and/or medical conditions. Orders for these recommended tests are listed in the plan section. The patient has been provided with a written plan.    Health Maintenance   Topic Date Due   • TDAP/TD VACCINES (1 - Tdap) 09/15/1941   • ZOSTER VACCINE (1 of 2) 09/15/1972   • PNEUMOCOCCAL VACCINES (65+ LOW/MEDIUM RISK) (1 of 2 - PCV13) 09/15/1987   • INFLUENZA VACCINE  08/01/2019          The following portions of the patient's history were reviewed and updated as appropriate: allergies, current medications, past family history, past medical  history, past social history, past surgical history and problem list.    Outpatient Medications Prior to Visit   Medication Sig Dispense Refill   • diazePAM (VALIUM) 5 MG tablet TAKE 1 TABLET BY MOUTH TWICE DAILY 60 tablet 4   • Multiple Vitamins-Minerals (DAILY MULTIVITAMIN PO) Take  by mouth.     • polyethylene glycol (MIRALAX) powder MIX AND DRINK 17 GRAMS(1 CAPFUL) BY MOUTH DAILY 527 g 0   • aspirin 81 MG EC tablet Take 81 mg by mouth.     • FRESHKOTE 2.7-2 % solution INSTILL ONE DROP IN OU TID  5   • KRILL OIL PO Take  by mouth.     • Probiotic Product (ALIGN) 4 MG capsule Take 4 mg by mouth 2 (Two) Times a Day. 60 capsule 5   • triamcinolone (KENALOG) 0.025 % cream Apply  topically 2 (Two) Times a Day. As needed for rash 80 g 0     No facility-administered medications prior to visit.        Patient Active Problem List   Diagnosis   • Anxiety   • Constipation   • Slow transit constipation   • Change in bowel habits       Advanced Care Planning:  Patient has an advance directive - a copy has been provided and is visible in patient header    Review of Systems    Compared to one year ago, the patient feels her physical health is the same.  Compared to one year ago, the patient feels her mental health is worse.    Reviewed chart for potential of high risk medication in the elderly: yes  Reviewed chart for potential of harmful drug interactions in the elderly:yes    Objective         Vitals:    10/30/19 0926   BP: 166/98   BP Location: Left arm   Patient Position: Sitting   Cuff Size: Adult   Pulse: 108   Temp: 97.7 °F (36.5 °C)   TempSrc: Tympanic   SpO2: 94%   Weight: 69.4 kg (153 lb)   PainSc: 0-No pain       Body mass index is 29.88 kg/m².  Discussed the patient's BMI with her. The BMI is above average; BMI management plan is completed.    Physical Exam          Assessment/Plan   Medicare Risks and Personalized Health Plan  CMS Preventative Services Quick Reference  Cardiovascular risk  Dementia/Memory   Fall  Risk    The above risks/problems have been discussed with the patient.  Pertinent information has been shared with the patient in the After Visit Summary.  Follow up plans and orders are seen below in the Assessment/Plan Section.    Diagnoses and all orders for this visit:    1. Anxiety (Primary)    2. Slow transit constipation    3. Medicare annual wellness visit, subsequent      Follow Up:  No Follow-up on file.     An After Visit Summary and PPPS were given to the patient.

## 2019-10-30 NOTE — PROGRESS NOTES
Subjective   Sho Cole is a 89 y.o. female.     Chief Complaint   Patient presents with   • GI Problem   • Annual Exam   • Anxiety         History of Present Illness   Patient is a very resolute lady who wishes to take very little medicine including blood pressure medicine.  Previously reviewed a CT of her abdomen but there is no evidence of obvious: Problem other than diverticulosis and constipation.  She is taking MiraLAX and wants to stick with that for now.  She is given other options for slow transit constipation including Linzess.    I reviewed high risk medication she has been on some degree of diazepam 5 mg for a long time.  She has been out of medicine.  I discussed lowering the dose which we will decrease to 2 mg at bedtime as needed for safety measure.    She was to get biometrics as far as lab test but her insurance requires us to be done at [facility] and she deferred getting the lab test at this time.      The following portions of the patient's history were reviewed and updated as appropriate: allergies, current medications, past social history and problem list.    Review of Systems   Constitutional: Negative.    HENT: Positive for hearing loss.    Eyes: Negative.    Respiratory: Negative.    Cardiovascular: Negative.    Gastrointestinal: Positive for constipation.   Endocrine: Negative.    Genitourinary: Negative.    Musculoskeletal: Negative.    Skin: Negative.    Allergic/Immunologic: Negative.    Neurological: Negative.    Hematological: Negative.    Psychiatric/Behavioral: Negative.        Objective   Vitals:    10/30/19 0926   BP: 166/98   Pulse: 108   Temp: 97.7 °F (36.5 °C)   SpO2: 94%     Physical Exam   Constitutional: She is oriented to person, place, and time. She appears well-developed and well-nourished.   HENT:   Head: Normocephalic and atraumatic.   Right Ear: Tympanic membrane and external ear normal. Decreased hearing is noted.   Left Ear: Tympanic membrane and external ear  normal. Decreased hearing is noted.   Nose: Nose normal.   Mouth/Throat: Oropharynx is clear and moist.   Eyes: Conjunctivae and EOM are normal. Pupils are equal, round, and reactive to light.   Neck: Normal range of motion. Neck supple. No JVD present. No thyromegaly present.   Cardiovascular: Normal rate, regular rhythm, normal heart sounds and intact distal pulses.   Pulmonary/Chest: Effort normal and breath sounds normal.   Abdominal: Soft. Bowel sounds are normal.   Musculoskeletal: Normal range of motion.   Lymphadenopathy:     She has no cervical adenopathy.   Neurological: She is alert and oriented to person, place, and time. No cranial nerve deficit. Coordination normal.   She is surprisingly agile for her ag  She moves on and off the exam table very well.e.   Skin: Skin is warm and dry. No rash noted.   Psychiatric: She has a normal mood and affect. Her behavior is normal. Judgment and thought content normal.   Vitals reviewed.      Assessment/Plan   Problem List Items Addressed This Visit        Digestive    Slow transit constipation       Other    Anxiety - Primary      Other Visit Diagnoses     Medicare annual wellness visit, subsequent          Continue MiraLAX as needed for constipation.  Valium/diazepam reduced to 2 mg at bedtime as needed.  Controlled substance agreement reviewed.  Recheck in a year sooner if needed.  She denies and refuses flu shot and treatment of her blood pressure at this time.  She will monitor blood pressure otherwise.

## 2019-10-30 NOTE — PATIENT INSTRUCTIONS
Medicare Wellness  Personal Prevention Plan of Service     Date of Office Visit:  10/30/2019  Encounter Provider:  Prieto Medina Jr., MD  Place of Service:  [facility]  Patient Name: Sho Cole  :  9/15/1922    As part of the Medicare Wellness portion of your visit today, we are providing you with this personalized preventive plan of services (PPPS). This plan is based upon recommendations of the [place] Preventive Services Task Force (USPSTF) and the Advisory Committee on Immunization Practices (ACIP).    This lists the preventive care services that should be considered, and provides dates of when you are due. Items listed as completed are up-to-date and do not require any further intervention.    Health Maintenance   Topic Date Due   • TDAP/TD VACCINES (1 - Tdap) 09/15/1941   • ZOSTER VACCINE (1 of 2) 09/15/1972   • PNEUMOCOCCAL VACCINES (65+ LOW/MEDIUM RISK) (1 of 2 - PCV13) 09/15/1987   • MEDICARE ANNUAL WELLNESS  2018   • INFLUENZA VACCINE  2019       No orders of the defined types were placed in this encounter.      No Follow-up on file.

## 2020-05-06 DIAGNOSIS — F41.9 ANXIETY: Primary | ICD-10-CM

## 2020-05-06 RX ORDER — DIAZEPAM 2 MG/1
TABLET ORAL
Qty: 30 TABLET | Refills: 2 | Status: SHIPPED | OUTPATIENT
Start: 2020-05-06 | End: 2020-11-02 | Stop reason: SDUPTHER

## 2020-11-02 ENCOUNTER — OFFICE VISIT (OUTPATIENT)
Dept: INTERNAL MEDICINE | Facility: CLINIC | Age: 85
End: 2020-11-02

## 2020-11-02 VITALS
DIASTOLIC BLOOD PRESSURE: 94 MMHG | WEIGHT: 139 LBS | OXYGEN SATURATION: 98 % | BODY MASS INDEX: 27.15 KG/M2 | HEART RATE: 75 BPM | SYSTOLIC BLOOD PRESSURE: 170 MMHG | TEMPERATURE: 97.7 F

## 2020-11-02 DIAGNOSIS — F41.9 ANXIETY: ICD-10-CM

## 2020-11-02 DIAGNOSIS — I10 ESSENTIAL HYPERTENSION: Primary | ICD-10-CM

## 2020-11-02 DIAGNOSIS — Z00.00 MEDICARE ANNUAL WELLNESS VISIT, SUBSEQUENT: ICD-10-CM

## 2020-11-02 PROCEDURE — 99213 OFFICE O/P EST LOW 20 MIN: CPT | Performed by: FAMILY MEDICINE

## 2020-11-02 PROCEDURE — G0439 PPPS, SUBSEQ VISIT: HCPCS | Performed by: FAMILY MEDICINE

## 2020-11-02 RX ORDER — DIAZEPAM 2 MG/1
2 TABLET ORAL NIGHTLY PRN
Qty: 30 TABLET | Refills: 2 | Status: SHIPPED | OUTPATIENT
Start: 2020-11-02 | End: 2021-04-12 | Stop reason: SDUPTHER

## 2020-11-02 NOTE — PATIENT INSTRUCTIONS
Medicare Wellness  Personal Prevention Plan of Service     Date of Office Visit:  2020  Encounter Provider:  Prieto Medina Jr., MD  Place of Service:  [facility]  Patient Name: Sho Cole  :  9/15/1922    As part of the Medicare Wellness portion of your visit today, we are providing you with this personalized preventive plan of services (PPPS). This plan is based upon recommendations of the [place] Preventive Services Task Force (USPSTF) and the Advisory Committee on Immunization Practices (ACIP).    This lists the preventive care services that should be considered, and provides dates of when you are due. Items listed as completed are up-to-date and do not require any further intervention.    Health Maintenance   Topic Date Due   • TDAP/TD VACCINES (1 - Tdap) 09/15/1941   • ZOSTER VACCINE (1 of 2) 09/15/1972   • Pneumococcal Vaccine 65+ (1 of 1 - PPSV23) 09/15/1987   • INFLUENZA VACCINE  2021 (Originally 2020)   • ANNUAL WELLNESS VISIT  2021       No orders of the defined types were placed in this encounter.      Return in about 1 year (around 2021) for Medicare Wellness.

## 2020-11-02 NOTE — PROGRESS NOTES
The ABCs of the Annual Wellness Visit  Subsequent Medicare Wellness Visit    Chief Complaint   Patient presents with   • Medicare Wellness-subsequent   • Anxiety       Subjective   History of Present Illness:  Sho Cole is a 89 y.o. female who presents for a Subsequent Medicare Wellness Visit.    HEALTH RISK ASSESSMENT    Recent Hospitalizations:  No hospitalization(s) within the last year.    Current Medical Providers:  Patient Care Team:  Prieto Medina Jr., MD as PCP - General (Family Medicine)    Smoking Status:  Social History     Tobacco Use   Smoking Status Former Smoker   Smokeless Tobacco Never Used       Alcohol Consumption:  Social History     Substance and Sexual Activity   Alcohol Use No       Depression Screen:   PHQ-2/PHQ-9 Depression Screening 11/2/2020   Little interest or pleasure in doing things 0   Feeling down, depressed, or hopeless 0   Trouble falling or staying asleep, or sleeping too much 3   Feeling tired or having little energy 0   Poor appetite or overeating 0   Feeling bad about yourself - or that you are a failure or have let yourself or your family down 0   Trouble concentrating on things, such as reading the newspaper or watching television 0   Moving or speaking so slowly that other people could have noticed. Or the opposite - being so fidgety or restless that you have been moving around a lot more than usual 0   Thoughts that you would be better off dead, or of hurting yourself in some way 0   Total Score 3   If you checked off any problems, how difficult have these problems made it for you to do your work, take care of things at home, or get along with other people? -       Fall Risk Screen:  STEADI Fall Risk Assessment was completed, and patient is at LOW risk for falls.Assessment completed on:11/2/2020    Health Habits and Functional and Cognitive Screening:  Functional & Cognitive Status 11/2/2020   Do you have difficulty preparing food and eating? No   Do you have difficulty  bathing yourself, getting dressed or grooming yourself? No   Do you have difficulty using the toilet? No   Do you have difficulty moving around from place to place? No   Do you have trouble with steps or getting out of a bed or a chair? No   Current Diet Well Balanced Diet   Dental Exam Not up to date        Dental Exam Comment due to COVID   Eye Exam Up to date        Eye Exam Comment has zena coming    Exercise (times per week) 7 times per week   Current Exercises Include Walking   Current Exercise Activities Include -   Do you need help using the phone?  No   Are you deaf or do you have serious difficulty hearing?  Yes   Do you need help with transportation? No   Do you need help shopping? No   Do you need help preparing meals?  No   Do you need help with housework?  No   Do you need help with laundry? No   Do you need help taking your medications? No   Do you need help managing money? No   Do you ever drive or ride in a car without wearing a seat belt? No   Have you felt unusual stress, anger or loneliness in the last month? No   Who do you live with? Alone   If you need help, do you have trouble finding someone available to you? No   Have you been bothered in the last four weeks by sexual problems? No   Do you have difficulty concentrating, remembering or making decisions? Yes         Does the patient have evidence of cognitive impairment? No    Asprin use counseling:Does not need ASA (and currently is not on it)    Age-appropriate Screening Schedule:  Refer to the list below for future screening recommendations based on patient's age, sex and/or medical conditions. Orders for these recommended tests are listed in the plan section. The patient has been provided with a written plan.    Health Maintenance   Topic Date Due   • TDAP/TD VACCINES (1 - Tdap) 09/15/1941   • ZOSTER VACCINE (1 of 2) 09/15/1972   • INFLUENZA VACCINE  11/02/2021 (Originally 8/1/2020)          The following portions of the patient's history  were reviewed and updated as appropriate: allergies, current medications, past family history, past medical history, past social history, past surgical history and problem list.    Outpatient Medications Prior to Visit   Medication Sig Dispense Refill   • diazePAM (VALIUM) 2 MG tablet TAKE 1 TABLET BY MOUTH AT NIGHT AS NEEDED FOR ANXIETY OR SLEEP 30 tablet 2   • Multiple Vitamins-Minerals (DAILY MULTIVITAMIN PO) Take  by mouth.     • polyethylene glycol (MIRALAX) powder MIX AND DRINK 17 GRAMS(1 CAPFUL) BY MOUTH DAILY 527 g 0     No facility-administered medications prior to visit.        Patient Active Problem List   Diagnosis   • Anxiety   • Constipation   • Slow transit constipation   • Change in bowel habits       Advanced Care Planning:  ACP discussion was held with the patient during this visit. Patient has an advance directive in EMR which is still valid.     Review of Systems    Compared to one year ago, the patient feels her physical health is the same.  Compared to one year ago, the patient feels her mental health is the same.    Reviewed chart for potential of high risk medication in the elderly: not applicable  Reviewed chart for potential of harmful drug interactions in the elderly:not applicable    Objective         Vitals:    11/02/20 0914   BP: 170/94   Pulse: 75   Temp: 97.7 °F (36.5 °C)   SpO2: 98%   Weight: 63 kg (139 lb)   PainSc: 0-No pain       Body mass index is 27.15 kg/m².  Discussed the patient's BMI with her. The BMI is in the acceptable range.    Physical Exam          Assessment/Plan   Medicare Risks and Personalized Health Plan  CMS Preventative Services Quick Reference  Cardiovascular risk  Fall Risk    The above risks/problems have been discussed with the patient.  Pertinent information has been shared with the patient in the After Visit Summary.  Follow up plans and orders are seen below in the Assessment/Plan Section.    There are no diagnoses linked to this encounter.  Follow  Up:  No follow-ups on file.     An After Visit Summary and PPPS were given to the patient.             Answers for HPI/ROS submitted by the patient on 11/1/2020   Hypertension  What is the primary reason for your visit?: High Blood Pressure  Chronicity: chronic  Onset: more than 1 year ago  Progression since onset: unchanged  Condition status: uncontrolled  anxiety: Yes  blurred vision: Yes  malaise/fatigue: Yes  orthopnea: No  peripheral edema: No  PND: No  sweats: No  Agents associated with hypertension: NSAIDs  CAD risks: dyslipidemia, obesity, post-menopausal state, stress  Compliance problems: diet, medication side effects, psychosocial issues

## 2021-02-01 ENCOUNTER — TELEPHONE (OUTPATIENT)
Dept: INTERNAL MEDICINE | Facility: CLINIC | Age: 86
End: 2021-02-01

## 2021-02-01 NOTE — TELEPHONE ENCOUNTER
PT WOULD LIKE TO KNOW IF SHE SHOULD RECEIVE THE COVID VACCINE DUE TO HER AGE. PLEASE CALL BACK AND ADVISE.   CALL BACK: [phone]

## 2021-02-01 NOTE — TELEPHONE ENCOUNTER
Left msg on the pt's voicemail that the only indication for not getting the vaccine is if she's ever had a serious reaction to a vaccine in the past.

## 2021-02-05 DIAGNOSIS — F41.9 ANXIETY: ICD-10-CM

## 2021-02-05 RX ORDER — DIAZEPAM 2 MG/1
TABLET ORAL
Qty: 30 TABLET | Refills: 3 | OUTPATIENT
Start: 2021-02-05

## 2021-04-12 ENCOUNTER — OFFICE VISIT (OUTPATIENT)
Dept: INTERNAL MEDICINE | Facility: CLINIC | Age: 86
End: 2021-04-12

## 2021-04-12 VITALS
HEART RATE: 83 BPM | WEIGHT: 138 LBS | TEMPERATURE: 98.2 F | OXYGEN SATURATION: 97 % | HEIGHT: 62 IN | DIASTOLIC BLOOD PRESSURE: 100 MMHG | BODY MASS INDEX: 25.4 KG/M2 | SYSTOLIC BLOOD PRESSURE: 148 MMHG

## 2021-04-12 DIAGNOSIS — R51.9 BILATERAL HEADACHE: Primary | ICD-10-CM

## 2021-04-12 DIAGNOSIS — G43.109 COMPLICATED MIGRAINE: ICD-10-CM

## 2021-04-12 DIAGNOSIS — I10 ESSENTIAL HYPERTENSION: ICD-10-CM

## 2021-04-12 DIAGNOSIS — F41.9 ANXIETY: ICD-10-CM

## 2021-04-12 PROCEDURE — 99214 OFFICE O/P EST MOD 30 MIN: CPT | Performed by: FAMILY MEDICINE

## 2021-04-12 RX ORDER — DIAZEPAM 2 MG/1
2 TABLET ORAL NIGHTLY PRN
Qty: 30 TABLET | Refills: 2 | Status: SHIPPED | OUTPATIENT
Start: 2021-04-12 | End: 2021-08-31

## 2021-04-12 RX ORDER — ATENOLOL 25 MG/1
25 TABLET ORAL DAILY
Qty: 90 TABLET | Refills: 1 | Status: SHIPPED | OUTPATIENT
Start: 2021-04-12 | End: 2021-09-27

## 2021-04-12 NOTE — PROGRESS NOTES
"Chief Complaint  Numbness (numbness, tingling in hands and arms) and Headache (painful pressure in head)    Subjective          Sho Cole presents to [facility]  89-year-old patient with a history of intermittent severe total head headache occurring about once a month with severe pain in the whole head with an associated sensation of paresthesias down both arms without weakness.  She states it is like an electric shock.  I discussed work-up and/or addition of antiplatelet drugs but she has had bleeding in the retina and anticoagulants would be generally not indicated.  Historically it sounds like she is having a complicated migraine as she has had for decades.    Reviewed the intermittent use of diazepam that she has had over the years and she uses it sparingly.  We will go I gave her a refill.      Objective   Vital Signs:   /100   Pulse 83   Temp 98.2 °F (36.8 °C)   Ht 157.5 cm (62\")   Wt 62.6 kg (138 lb)   SpO2 97%   BMI 25.24 kg/m²     Physical Exam  Vitals reviewed.   Constitutional:       Appearance: She is well-developed.   HENT:      Head: Normocephalic and atraumatic.      Right Ear: Tympanic membrane and external ear normal. Decreased hearing noted.      Left Ear: Tympanic membrane and external ear normal. Decreased hearing noted.   Eyes:      Conjunctiva/sclera: Conjunctivae normal.      Pupils: Pupils are equal, round, and reactive to light.   Neck:      Thyroid: No thyromegaly.      Vascular: No JVD.   Cardiovascular:      Rate and Rhythm: Normal rate and regular rhythm.      Heart sounds: Normal heart sounds.   Pulmonary:      Effort: Pulmonary effort is normal.      Breath sounds: Normal breath sounds.   Abdominal:      General: Bowel sounds are normal.      Palpations: Abdomen is soft.   Musculoskeletal:         General: Normal range of motion.      Cervical back: Normal range of motion and neck supple.   Lymphadenopathy:      Cervical: No cervical " adenopathy.   Skin:     General: Skin is warm and dry.      Findings: No rash.   Neurological:      Mental Status: She is alert and oriented to person, place, and time.      Cranial Nerves: No cranial nerve deficit.      Coordination: Coordination normal.   Psychiatric:         Behavior: Behavior normal.         Thought Content: Thought content normal.         Judgment: Judgment normal.        Result Review :                 Assessment and Plan    Diagnoses and all orders for this visit:    1. Bilateral headache (Primary)    2. Complicated migraine    3. Anxiety  -     diazePAM (VALIUM) 2 MG tablet; Take 1 tablet by mouth At Night As Needed for Anxiety (sleep).  Dispense: 30 tablet; Refill: 2    Other orders  -     atenolol (TENORMIN) 25 MG tablet; Take 1 tablet by mouth Daily. For blood pressure and prevent headache.  Dispense: 90 tablet; Refill: 1        Follow Up   No follow-ups on file.  Patient was given instructions and counseling regarding her condition or for health maintenance advice. Please see specific information pulled into the AVS if appropriate.

## 2021-08-30 DIAGNOSIS — F41.9 ANXIETY: ICD-10-CM

## 2021-08-31 RX ORDER — DIAZEPAM 2 MG/1
TABLET ORAL
Qty: 30 TABLET | Refills: 1 | Status: SHIPPED | OUTPATIENT
Start: 2021-08-31 | End: 2021-11-04 | Stop reason: SDUPTHER

## 2021-09-27 RX ORDER — ATENOLOL 25 MG/1
TABLET ORAL
Qty: 90 TABLET | Refills: 1 | Status: SHIPPED | OUTPATIENT
Start: 2021-09-27 | End: 2021-11-04 | Stop reason: SDUPTHER

## 2021-11-04 ENCOUNTER — OFFICE VISIT (OUTPATIENT)
Dept: INTERNAL MEDICINE | Facility: CLINIC | Age: 86
End: 2021-11-04

## 2021-11-04 VITALS
BODY MASS INDEX: 25.97 KG/M2 | WEIGHT: 142 LBS | OXYGEN SATURATION: 96 % | TEMPERATURE: 97.7 F | DIASTOLIC BLOOD PRESSURE: 88 MMHG | HEART RATE: 74 BPM | SYSTOLIC BLOOD PRESSURE: 146 MMHG

## 2021-11-04 DIAGNOSIS — F41.9 ANXIETY: ICD-10-CM

## 2021-11-04 DIAGNOSIS — R00.2 PALPITATIONS: ICD-10-CM

## 2021-11-04 DIAGNOSIS — Z00.00 MEDICARE ANNUAL WELLNESS VISIT, SUBSEQUENT: ICD-10-CM

## 2021-11-04 DIAGNOSIS — K59.01 SLOW TRANSIT CONSTIPATION: ICD-10-CM

## 2021-11-04 DIAGNOSIS — I10 PRIMARY HYPERTENSION: Primary | ICD-10-CM

## 2021-11-04 PROCEDURE — 99214 OFFICE O/P EST MOD 30 MIN: CPT | Performed by: FAMILY MEDICINE

## 2021-11-04 RX ORDER — POLYETHYLENE GLYCOL 3350 17 G/17G
17 POWDER, FOR SOLUTION ORAL DAILY
Qty: 527 G | Refills: 11 | Status: SHIPPED | OUTPATIENT
Start: 2021-11-04

## 2021-11-04 RX ORDER — DIAZEPAM 2 MG/1
2 TABLET ORAL EVERY 8 HOURS PRN
Qty: 90 TABLET | Refills: 3 | Status: SHIPPED | OUTPATIENT
Start: 2021-11-04 | End: 2022-11-08

## 2021-11-04 RX ORDER — ATENOLOL 25 MG/1
25 TABLET ORAL DAILY
Qty: 90 TABLET | Refills: 3 | Status: SHIPPED | OUTPATIENT
Start: 2021-11-04 | End: 2022-02-11

## 2021-11-04 NOTE — PROGRESS NOTES
Chief Complaint  Medicare Wellness-subsequent    Subjective          Sho Cole presents to [facility]  Very robust lady accompanied by her daughter.  She desires to take minimal medication.  She is tolerating atenolol 25 mg daily which is helping the blood pressure and palpitations.  She has slow transit constipation treated with MiraLAX daily and coffee.  Otherwise chronic anxiety treated with intermittent as needed diazepam 2 mg at bedtime.  We discussed pros and cons of sleep hygiene and diazepam.      Objective   Vital Signs:   /88 (BP Location: Left arm, Patient Position: Sitting, Cuff Size: Adult)   Pulse 74   Temp 97.7 °F (36.5 °C) (Tympanic)   Wt 64.4 kg (142 lb)   SpO2 96%   BMI 25.97 kg/m²     Physical Exam  Vitals reviewed.   Constitutional:       Appearance: She is well-developed.   HENT:      Head: Normocephalic and atraumatic.      Right Ear: External ear normal. Decreased hearing noted.      Left Ear: External ear normal. Decreased hearing noted.      Nose: Nose normal.   Eyes:      Conjunctiva/sclera: Conjunctivae normal.      Pupils: Pupils are equal, round, and reactive to light.   Neck:      Thyroid: No thyromegaly.      Vascular: No JVD.   Cardiovascular:      Rate and Rhythm: Normal rate and regular rhythm.      Heart sounds: Normal heart sounds.   Pulmonary:      Effort: Pulmonary effort is normal.      Breath sounds: Normal breath sounds.   Abdominal:      General: Bowel sounds are normal.      Palpations: Abdomen is soft.   Musculoskeletal:      Cervical back: Normal range of motion and neck supple.      Lumbar back: Decreased range of motion.   Lymphadenopathy:      Cervical: No cervical adenopathy.   Skin:     General: Skin is warm and dry.      Findings: No rash.   Neurological:      Mental Status: She is alert and oriented to person, place, and time.      Cranial Nerves: No cranial nerve deficit.      Coordination: Coordination normal.    Psychiatric:         Behavior: Behavior normal.         Thought Content: Thought content normal.         Judgment: Judgment normal.        Result Review :                 Assessment and Plan    Diagnoses and all orders for this visit:    1. Primary hypertension (Primary)  Comments:  Atenolol 25 mg daily    2. Slow transit constipation  Comments:  MiraLAX 17 g daily    3. Anxiety  Comments:  Limited diazepam 2 mg daily as needed  Orders:  -     diazePAM (VALIUM) 2 MG tablet; Take 1 tablet by mouth Every 8 (Eight) Hours As Needed for Anxiety.  Dispense: 90 tablet; Refill: 3    4. Medicare annual wellness visit, subsequent    5. Anxiety  Comments:  Rare use of as needed diazepam 2 mg at bedtime as needed  Orders:  -     diazePAM (VALIUM) 2 MG tablet; Take 1 tablet by mouth Every 8 (Eight) Hours As Needed for Anxiety.  Dispense: 90 tablet; Refill: 3    6. Palpitations  Comments:  Atenolol 25 mg daily    Other orders  -     atenolol (TENORMIN) 25 MG tablet; Take 1 tablet by mouth Daily.  Dispense: 90 tablet; Refill: 3  -     polyethylene glycol (MIRALAX) 17 GM/SCOOP powder; Take 17 g by mouth Daily.  Dispense: 527 g; Refill: 11        Follow Up   Return in about 1 year (around 11/4/2022) for Medicare Wellness.  Patient was given instructions and counseling regarding her condition or for health maintenance advice. Please see specific information pulled into the AVS if appropriate.

## 2021-11-04 NOTE — PROGRESS NOTES
The ABCs of the Annual Wellness Visit  Subsequent Medicare Wellness Visit    Chief Complaint   Patient presents with   • Medicare Wellness-subsequent      Subjective    History of Present Illness:  Sho Cole is a 89 y.o. female who presents for a Subsequent Medicare Wellness Visit.    The following portions of the patient's history were reviewed and   updated as appropriate: allergies, current medications, past family history, past medical history, past social history, past surgical history and problem list.    Compared to one year ago, the patient feels her physical   health is the same.    Compared to one year ago, the patient feels her mental   health is the same.    Recent Hospitalizations:  She was not admitted to the hospital during the last year.       Current Medical Providers:  Patient Care Team:  Prieto Medina Jr., MD as PCP - General (Family Medicine)    Outpatient Medications Prior to Visit   Medication Sig Dispense Refill   • Multiple Vitamins-Minerals (DAILY MULTIVITAMIN PO) Take  by mouth.     • atenolol (TENORMIN) 25 MG tablet TAKE 1 TABLET BY MOUTH DAILY FOR BLOOD PRESSURE OR HEADACHE PREVENTION 90 tablet 1   • diazePAM (VALIUM) 2 MG tablet TAKE 1 TABLET BY MOUTH AT NIGHT AS NEEDED FOR ANXIETY OR SLEEP 30 tablet 1   • polyethylene glycol (MIRALAX) powder MIX AND DRINK 17 GRAMS(1 CAPFUL) BY MOUTH DAILY 527 g 0     No facility-administered medications prior to visit.       No opioid medication identified on active medication list. I have reviewed chart for other potential  high risk medication/s and harmful drug interactions in the elderly.          Aspirin is not on active medication list.  Aspirin use is not indicated based on review of current medical condition/s. Risk of harm outweighs potential benefits.  .    Patient Active Problem List   Diagnosis   • Anxiety   • Constipation   • Slow transit constipation   • Change in bowel habits   • Hypertension     Advance Care Planning  Advance  Directive is not on file.  ACP discussion was held with the patient during this visit. Patient has an advance directive (not in EMR), copy requested.          Objective    Vitals:    11/04/21 0833   BP: 146/88   BP Location: Left arm   Patient Position: Sitting   Cuff Size: Adult   Pulse: 74   Temp: 97.7 °F (36.5 °C)   TempSrc: Tympanic   SpO2: 96%   Weight: 64.4 kg (142 lb)   PainSc: 0-No pain     Body mass index is 25.97 kg/m².  BMI has not been calculated during today's encounter.     Does the patient have evidence of cognitive impairment? No    Physical Exam            HEALTH RISK ASSESSMENT    Smoking Status:  Social History     Tobacco Use   Smoking Status Former Smoker   Smokeless Tobacco Never Used     Alcohol Consumption:  Social History     Substance and Sexual Activity   Alcohol Use No     Fall Risk Screen:    STEADI Fall Risk Assessment was completed, and patient is at LOW risk for falls.Assessment completed on:11/4/2021    Depression Screening:  PHQ-2/PHQ-9 Depression Screening 11/4/2021   Little interest or pleasure in doing things 0   Feeling down, depressed, or hopeless 0   Trouble falling or staying asleep, or sleeping too much -   Feeling tired or having little energy -   Poor appetite or overeating -   Feeling bad about yourself - or that you are a failure or have let yourself or your family down -   Trouble concentrating on things, such as reading the newspaper or watching television -   Moving or speaking so slowly that other people could have noticed. Or the opposite - being so fidgety or restless that you have been moving around a lot more than usual -   Thoughts that you would be better off dead, or of hurting yourself in some way -   Total Score 0   If you checked off any problems, how difficult have these problems made it for you to do your work, take care of things at home, or get along with other people? -       Health Habits and Functional and Cognitive Screening:  Functional & Cognitive  Status 11/4/2021   Do you have difficulty preparing food and eating? No   Do you have difficulty bathing yourself, getting dressed or grooming yourself? No   Do you have difficulty using the toilet? No   Do you have difficulty moving around from place to place? No   Do you have trouble with steps or getting out of a bed or a chair? No   Current Diet Well Balanced Diet   Dental Exam Not up to date        Dental Exam Comment -   Eye Exam Up to date        Eye Exam Comment -   Exercise (times per week) 7 times per week   Current Exercises Include Walking   Current Exercise Activities Include -   Do you need help using the phone?  No   Are you deaf or do you have serious difficulty hearing?  Yes   Do you need help with transportation? Yes   Do you need help shopping? No   Do you need help preparing meals?  No   Do you need help with housework?  No   Do you need help with laundry? No   Do you need help taking your medications? No   Do you need help managing money? No   Do you ever drive or ride in a car without wearing a seat belt? No   Have you felt unusual stress, anger or loneliness in the last month? No   Who do you live with? Alone   If you need help, do you have trouble finding someone available to you? No   Have you been bothered in the last four weeks by sexual problems? No   Do you have difficulty concentrating, remembering or making decisions? No       Age-appropriate Screening Schedule:  Refer to the list below for future screening recommendations based on patient's age, sex and/or medical conditions. Orders for these recommended tests are listed in the plan section. The patient has been provided with a written plan.    Health Maintenance   Topic Date Due   • DXA SCAN  Never done   • TDAP/TD VACCINES (1 - Tdap) Never done   • ZOSTER VACCINE (1 of 2) Never done   • INFLUENZA VACCINE  08/01/2021              Assessment/Plan   CMS Preventative Services Quick Reference  Risk Factors Identified During  Encounter  Cardiovascular Disease  Dementia/Memory   Fall Risk-High or Moderate  The above risks/problems have been discussed with the patient.  Follow up actions/plans if indicated are seen below in the Assessment/Plan Section.  Pertinent information has been shared with the patient in the After Visit Summary.    Diagnoses and all orders for this visit:    1. Primary hypertension (Primary)    2. Slow transit constipation    3. Anxiety  -     diazePAM (VALIUM) 2 MG tablet; Take 1 tablet by mouth Every 8 (Eight) Hours As Needed for Anxiety.  Dispense: 90 tablet; Refill: 3    4. Medicare annual wellness visit, subsequent    5. Anxiety  Comments:  Rare use of as needed diazepam 2 mg at bedtime as needed  Orders:  -     diazePAM (VALIUM) 2 MG tablet; Take 1 tablet by mouth Every 8 (Eight) Hours As Needed for Anxiety.  Dispense: 90 tablet; Refill: 3    Other orders  -     atenolol (TENORMIN) 25 MG tablet; Take 1 tablet by mouth Daily.  Dispense: 90 tablet; Refill: 3  -     polyethylene glycol (MIRALAX) 17 GM/SCOOP powder; Take 17 g by mouth Daily.  Dispense: 527 g; Refill: 11        Follow Up:   No follow-ups on file.     An After Visit Summary and PPPS were made available to the patient.

## 2021-11-04 NOTE — PATIENT INSTRUCTIONS
Medicare Wellness  Personal Prevention Plan of Service     Date of Office Visit:  2021  Encounter Provider:  Prieto Medina Jr, MD  Place of Service:  [facility]  Patient Name: Sho Cloe  :  9/15/1922    As part of the Medicare Wellness portion of your visit today, we are providing you with this personalized preventive plan of services (PPPS). This plan is based upon recommendations of the [place] Preventive Services Task Force (USPSTF) and the Advisory Committee on Immunization Practices (ACIP).    This lists the preventive care services that should be considered, and provides dates of when you are due. Items listed as completed are up-to-date and do not require any further intervention.    Health Maintenance   Topic Date Due   • DXA SCAN  Never done   • ANNUAL PHYSICAL  Never done   • TDAP/TD VACCINES (1 - Tdap) Never done   • ZOSTER VACCINE (1 of 2) Never done   • Pneumococcal Vaccine 65+ (1 of 1 - PPSV23) Never done   • INFLUENZA VACCINE  2021   • COVID-19 Vaccine  Completed       No orders of the defined types were placed in this encounter.      Return in about 1 year (around 2022) for Medicare Wellness.

## 2021-12-13 NOTE — PROGRESS NOTES
Subjective  Answers for HPI/ROS submitted by the patient on 11/1/2020   Hypertension  What is the primary reason for your visit?: High Blood Pressure    Sho Cole is a 89 y.o. female.     Chief Complaint   Patient presents with   • Medicare Wellness-subsequent   • Anxiety         History of Present Illness   Very pleasant lady with history of some degree of anxiety and insomnia.  This she states is her worst problem.  She has whitecoat hypertension refuses to take blood pressure medicine.  Labs from 2 years ago were within normal limits.    She does take some occasional Valium 2 mg over the past several years.  Pros and cons of this were discussed with her and her daughter.  We will refill of the Valium on a sparing basis.  Side effects are discussed as far as sedation increased risk of falling etc.      The following portions of the patient's history were reviewed and updated as appropriate: allergies, current medications, past social history and problem list.    Review of Systems   Constitutional: Negative.    HENT: Negative.    Eyes: Negative.    Respiratory: Negative.  Negative for shortness of breath.    Cardiovascular: Negative.  Negative for chest pain and palpitations.   Gastrointestinal: Negative.    Endocrine: Negative.    Genitourinary: Negative.    Musculoskeletal: Negative.  Negative for neck pain.   Skin: Negative.    Allergic/Immunologic: Negative.    Neurological: Negative.  Negative for headaches.   Hematological: Negative.    Psychiatric/Behavioral: Positive for sleep disturbance.       Objective   Vitals:    11/02/20 0914   BP: 170/94   Pulse: 75   Temp: 97.7 °F (36.5 °C)   SpO2: 98%     Physical Exam  Vitals signs reviewed.   Constitutional:       Appearance: She is well-developed.   HENT:      Head: Normocephalic and atraumatic.      Right Ear: Tympanic membrane and external ear normal.      Left Ear: Tympanic membrane and external ear normal.   Eyes:      Conjunctiva/sclera: Conjunctivae  normal.      Pupils: Pupils are equal, round, and reactive to light.   Neck:      Musculoskeletal: Normal range of motion and neck supple.      Thyroid: No thyromegaly.      Vascular: No JVD.   Cardiovascular:      Rate and Rhythm: Normal rate and regular rhythm.      Heart sounds: Normal heart sounds.   Pulmonary:      Effort: Pulmonary effort is normal.      Breath sounds: Normal breath sounds.   Abdominal:      General: Bowel sounds are normal.      Palpations: Abdomen is soft.   Musculoskeletal: Normal range of motion.   Lymphadenopathy:      Cervical: No cervical adenopathy.   Skin:     General: Skin is warm and dry.      Findings: No rash.   Neurological:      Mental Status: She is alert and oriented to person, place, and time.      Cranial Nerves: No cranial nerve deficit.      Coordination: Coordination normal.   Psychiatric:         Behavior: Behavior normal.         Thought Content: Thought content normal.         Judgment: Judgment normal.         Assessment/Plan   Problems Addressed this Visit        Cardiovascular and Mediastinum    Hypertension - Primary       Other    Anxiety    Relevant Medications    diazePAM (VALIUM) 2 MG tablet      Other Visit Diagnoses     Medicare annual wellness visit, subsequent          Diagnoses       Codes Comments    Essential hypertension    -  Primary ICD-10-CM: I10  ICD-9-CM: 401.9     Anxiety     ICD-10-CM: F41.9  ICD-9-CM: 300.00     Medicare annual wellness visit, subsequent     ICD-10-CM: Z00.00  ICD-9-CM: V70.0       Plan: Recheck Medicare wellness 1 year.  Return as needed otherwise.  Refill diazepam 2 mg #30 refill x2 daily as needed.        Opioid Pregnancy And Lactation Text: These medications can lead to premature delivery and should be avoided during pregnancy. These medications are also present in breast milk in small amounts.

## 2022-02-11 RX ORDER — ATENOLOL 25 MG/1
TABLET ORAL
Qty: 90 TABLET | Refills: 3 | Status: SHIPPED | OUTPATIENT
Start: 2022-02-11 | End: 2022-03-22 | Stop reason: SDUPTHER

## 2022-03-22 ENCOUNTER — TELEPHONE (OUTPATIENT)
Dept: INTERNAL MEDICINE | Facility: CLINIC | Age: 87
End: 2022-03-22

## 2022-03-22 RX ORDER — ATENOLOL 25 MG/1
25 TABLET ORAL DAILY
Qty: 90 TABLET | Refills: 1 | Status: SHIPPED | OUTPATIENT
Start: 2022-03-22 | End: 2022-09-22

## 2022-03-22 NOTE — TELEPHONE ENCOUNTER
Caller: Sho Cole    Relationship: Self    Best call back number: [phone]  Who are you requesting to speak with (clinical staff, provider,  specific staff member): DR GIPSON OR MA    What was the call regarding: PATIENT STATES THAT DUE TO INSURANCE, HAD TO HAVE PRESCRIPTIONS SENT VIA [facility]. [facility] FAXED OFFICE NECESSARY PAPERWORK, WHICH HAS NOT BEEN RETURNED. STATES THAT OFFICE CAN CALL [facility] TO CLARIFY. PLEASE CALL AND ADVISE PATIENT    PHARMACY: [facility] - [place], [place] - [address] AT [address] [facility] - [phone] [facility] [phone] FX    PHARMACY PHONE: [phone]    MEDICATION: atenolol (TENORMIN) 25 MG tablet    Do you require a callback: IF REQUIRED

## 2022-05-19 DIAGNOSIS — F41.9 ANXIETY: ICD-10-CM

## 2022-05-19 RX ORDER — DIAZEPAM 2 MG/1
TABLET ORAL
Qty: 90 TABLET | OUTPATIENT
Start: 2022-05-19

## 2022-09-22 DIAGNOSIS — I10 PRIMARY HYPERTENSION: Primary | ICD-10-CM

## 2022-09-22 RX ORDER — ATENOLOL 25 MG/1
TABLET ORAL
Qty: 90 TABLET | Refills: 1 | Status: SHIPPED | OUTPATIENT
Start: 2022-09-22 | End: 2022-11-08 | Stop reason: ALTCHOICE

## 2022-11-08 ENCOUNTER — OFFICE VISIT (OUTPATIENT)
Dept: INTERNAL MEDICINE | Facility: CLINIC | Age: 87
End: 2022-11-08

## 2022-11-08 VITALS
WEIGHT: 150 LBS | BODY MASS INDEX: 27.44 KG/M2 | TEMPERATURE: 97.8 F | SYSTOLIC BLOOD PRESSURE: 160 MMHG | HEART RATE: 78 BPM | OXYGEN SATURATION: 97 % | DIASTOLIC BLOOD PRESSURE: 92 MMHG

## 2022-11-08 DIAGNOSIS — Z00.00 MEDICARE ANNUAL WELLNESS VISIT, SUBSEQUENT: ICD-10-CM

## 2022-11-08 DIAGNOSIS — I10 PRIMARY HYPERTENSION: Primary | ICD-10-CM

## 2022-11-08 DIAGNOSIS — K59.01 SLOW TRANSIT CONSTIPATION: ICD-10-CM

## 2022-11-08 PROCEDURE — 99213 OFFICE O/P EST LOW 20 MIN: CPT | Performed by: FAMILY MEDICINE

## 2022-11-08 PROCEDURE — 99397 PER PM REEVAL EST PAT 65+ YR: CPT | Performed by: FAMILY MEDICINE

## 2022-11-08 RX ORDER — METOPROLOL SUCCINATE 25 MG/1
25 TABLET, EXTENDED RELEASE ORAL DAILY
Qty: 90 TABLET | Refills: 3 | Status: SHIPPED | OUTPATIENT
Start: 2022-11-08

## 2022-11-08 NOTE — PROGRESS NOTES
The ABCs of the Annual Wellness Visit  Subsequent Medicare Wellness Visit    Chief Complaint   Patient presents with   • Medicare Wellness-subsequent      Subjective    History of Present Illness:  Sho Cole is a 89 y.o. female who presents for a Subsequent Medicare Wellness Visit.    The patient feels about the same since her last visit. She has not been hospitalized. She has a living will. Ms. Cole is taking atenolol. Her blood pressure is elevated, 160/92 mmHg, and her heart rate is 78 bpm. On repeat her blood pressure is 180/92 mmHg. She adds that her blood pressure is normally close to normal.     Ms. Cole reports difficulty sleeping. She is no longer taking Valium. Her daughter states she has not taken Valium since April. The patient reports she has not taken Valium in over 1 year.      The patient is taking MiraLAX, and confirms it works well.     Her back, knees, and ankles are doing fine. She is able to walk up and down stairs without any issues. Denies any falls.     The following portions of the patient's history were reviewed and   updated as appropriate: allergies, current medications, past family history, past medical history, past social history, past surgical history and problem list.    Compared to one year ago, the patient feels her physical   health is the same.    Compared to one year ago, the patient feels her mental   health is the same.    Recent Hospitalizations:  She was not admitted to the hospital during the last year.       Current Medical Providers:  Patient Care Team:  Prieto Medina MD as PCP - General (Family Medicine)    Outpatient Medications Prior to Visit   Medication Sig Dispense Refill   • Multiple Vitamins-Minerals (DAILY MULTIVITAMIN PO) Take  by mouth.     • polyethylene glycol (MIRALAX) 17 GM/SCOOP powder Take 17 g by mouth Daily. 527 g 11   • atenolol (TENORMIN) 25 MG tablet TAKE 1 TABLET DAILY 90 tablet 1   • diazePAM (VALIUM) 2 MG tablet Take 1 tablet by mouth  "Every 8 (Eight) Hours As Needed for Anxiety. 90 tablet 3     No facility-administered medications prior to visit.       No opioid medication identified on active medication list. I have reviewed chart for other potential  high risk medication/s and harmful drug interactions in the elderly.          Aspirin is not on active medication list.  Aspirin use is not indicated based on review of current medical condition/s. Risk of harm outweighs potential benefits.  .    Patient Active Problem List   Diagnosis   • Anxiety   • Constipation   • Slow transit constipation   • Change in bowel habits   • Hypertension   • Medicare annual wellness visit, subsequent     Advance Care Planning  Advance Directive is not on file.  ACP discussion was held with the patient during this visit. Patient has an advance directive (not in EMR), copy requested.          Objective    Vitals:    11/08/22 0819   BP: 160/92   BP Location: Left arm   Patient Position: Sitting   Cuff Size: Adult   Pulse: 78   Temp: 97.8 °F (36.6 °C)   TempSrc: Tympanic   SpO2: 97%   Weight: 68 kg (150 lb)   PainSc: 0-No pain     Estimated body mass index is 27.44 kg/m² as calculated from the following:    Height as of 4/12/21: 157.5 cm (62\").    Weight as of this encounter: 68 kg (150 lb).    BMI is >= 25 and <30. (Overweight) The following options were offered after discussion;: none (medical contraindication)      Does the patient have evidence of cognitive impairment? No    Physical Exam  Vitals reviewed.   Constitutional:       Appearance: She is well-developed.   HENT:      Head: Normocephalic and atraumatic.      Right Ear: Tympanic membrane and external ear normal.      Left Ear: Tympanic membrane and external ear normal.      Nose: Nose normal.   Eyes:      Conjunctiva/sclera: Conjunctivae normal.      Pupils: Pupils are equal, round, and reactive to light.   Neck:      Thyroid: No thyromegaly.      Vascular: No JVD.   Cardiovascular:      Rate and Rhythm: " Normal rate and regular rhythm.      Heart sounds: Normal heart sounds.   Pulmonary:      Effort: Pulmonary effort is normal.      Breath sounds: Normal breath sounds.   Abdominal:      General: Bowel sounds are normal.      Palpations: Abdomen is soft.   Musculoskeletal:         General: Normal range of motion.      Cervical back: Normal range of motion and neck supple.   Lymphadenopathy:      Cervical: No cervical adenopathy.   Skin:     General: Skin is warm and dry.      Findings: No rash.   Neurological:      Mental Status: She is alert and oriented to person, place, and time.      Cranial Nerves: No cranial nerve deficit.      Coordination: Coordination normal.   Psychiatric:         Behavior: Behavior normal.         Thought Content: Thought content normal.         Judgment: Judgment normal.                 HEALTH RISK ASSESSMENT    Smoking Status:  Social History     Tobacco Use   Smoking Status Former   Smokeless Tobacco Never     Alcohol Consumption:  Social History     Substance and Sexual Activity   Alcohol Use No     Fall Risk Screen:    STEADI Fall Risk Assessment was completed, and patient is at LOW risk for falls.Assessment completed on:11/8/2022    Depression Screening:  PHQ-2/PHQ-9 Depression Screening 11/8/2022   Retired PHQ-9 Total Score -   Retired Total Score -   Little Interest or Pleasure in Doing Things 0-->not at all   Feeling Down, Depressed or Hopeless 0-->not at all   PHQ-9: Brief Depression Severity Measure Score 0       Health Habits and Functional and Cognitive Screening:  Functional & Cognitive Status 11/8/2022   Do you have difficulty preparing food and eating? No   Do you have difficulty bathing yourself, getting dressed or grooming yourself? No   Do you have difficulty using the toilet? No   Do you have difficulty moving around from place to place? No   Do you have trouble with steps or getting out of a bed or a chair? No   Current Diet Well Balanced Diet   Dental Exam Up to date         Dental Exam Comment -   Eye Exam Up to date        Eye Exam Comment -   Exercise (times per week) 7 times per week   Current Exercises Include Walking   Current Exercise Activities Include -   Do you need help using the phone?  No   Are you deaf or do you have serious difficulty hearing?  Yes   Do you need help with transportation? Yes   Do you need help shopping? No   Do you need help preparing meals?  No   Do you need help with housework?  No   Do you need help with laundry? No   Do you need help taking your medications? No   Do you need help managing money? No   Do you ever drive or ride in a car without wearing a seat belt? No   Have you felt unusual stress, anger or loneliness in the last month? No   Who do you live with? Alone   If you need help, do you have trouble finding someone available to you? No   Have you been bothered in the last four weeks by sexual problems? No   Do you have difficulty concentrating, remembering or making decisions? No       Age-appropriate Screening Schedule:  Refer to the list below for future screening recommendations based on patient's age, sex and/or medical conditions. Orders for these recommended tests are listed in the plan section. The patient has been provided with a written plan.    Health Maintenance   Topic Date Due   • DXA SCAN  Never done   • TDAP/TD VACCINES (1 - Tdap) Never done   • ZOSTER VACCINE (1 of 2) Never done   • INFLUENZA VACCINE  08/01/2022              Assessment & Plan   CMS Preventative Services Quick Reference  Risk Factors Identified During Encounter  Cardiovascular Disease  Fall Risk-High or Moderate  The above risks/problems have been discussed with the patient.  Follow up actions/plans if indicated are seen below in the Assessment/Plan Section.  Pertinent information has been shared with the patient in the After Visit Summary.    Diagnoses and all orders for this visit:    1. Primary hypertension (Primary)  Assessment & Plan:  Hypertension  is worsening.  Medication changes per orders.  Blood pressure will be reassessed at the next regular appointment.  Discontinue atenolol 25 mg counseled on taking metoprolol XL 25 mg every morning.      2. Slow transit constipation  Assessment & Plan:  MiraLAX 17 g daily in 8 ounces water is effective.      3. Medicare annual wellness visit, subsequent    Other orders  -     metoprolol succinate XL (Toprol XL) 25 MG 24 hr tablet; Take 1 tablet by mouth Daily.  Dispense: 90 tablet; Refill: 3      Follow Up:   Return in about 1 year (around 11/8/2023) for Medicare Wellness.     An After Visit Summary and PPPS were made available to the patient.                 Transcribed from ambient dictation for Prieto Medina MD by Yany Padilla.  11/08/22   09:35 EST    Patient or patient representative verbalized consent to the visit recording.  I have personally performed the services described in this document as transcribed by the above individual, and it is both accurate and complete.

## 2022-11-08 NOTE — ASSESSMENT & PLAN NOTE
Hypertension is worsening.  Medication changes per orders.  Blood pressure will be reassessed at the next regular appointment.  Discontinue atenolol 25 mg counseled on taking metoprolol XL 25 mg every morning.

## 2022-11-08 NOTE — PATIENT INSTRUCTIONS
Medicare Wellness  Personal Prevention Plan of Service     Date of Office Visit:    Encounter Provider:  Prieto Medina MD  Place of Service:  [facility]  Patient Name: Sho Cole  :  9/15/1922    As part of the Medicare Wellness portion of your visit today, we are providing you with this personalized preventive plan of services (PPPS). This plan is based upon recommendations of the [place] Preventive Services Task Force (USPSTF) and the Advisory Committee on Immunization Practices (ACIP).    This lists the preventive care services that should be considered, and provides dates of when you are due. Items listed as completed are up-to-date and do not require any further intervention.    Health Maintenance   Topic Date Due    DXA SCAN  Never done    TDAP/TD VACCINES (1 - Tdap) Never done    ZOSTER VACCINE (1 of 2) Never done    Pneumococcal Vaccine 65+ (1 - PCV) Never done    ANNUAL WELLNESS VISIT  2021    INFLUENZA VACCINE  2022    COVID-19 Vaccine  Completed       No orders of the defined types were placed in this encounter.      No follow-ups on file.

## 2022-11-08 NOTE — PROGRESS NOTES
"Chief Complaint  Medicare Wellness-subsequent    Subjective        Sho Cole presents to [facility]  History of Present Illness    Objective   Vital Signs:  /92 (BP Location: Left arm, Patient Position: Sitting, Cuff Size: Adult)   Pulse 78   Temp 97.8 °F (36.6 °C) (Tympanic)   Wt 68 kg (150 lb)   SpO2 97%   BMI 27.44 kg/m²   Estimated body mass index is 27.44 kg/m² as calculated from the following:    Height as of 4/12/21: 157.5 cm (62\").    Weight as of this encounter: 68 kg (150 lb).          Physical Exam   Result Review :                Assessment and Plan   Diagnoses and all orders for this visit:    1. Primary hypertension (Primary)             Follow Up   No follow-ups on file.  Patient was given instructions and counseling regarding her condition or for health maintenance advice. Please see specific information pulled into the AVS if appropriate.       "

## 2023-02-17 ENCOUNTER — TELEPHONE (OUTPATIENT)
Dept: INTERNAL MEDICINE | Facility: CLINIC | Age: 88
End: 2023-02-17
Payer: COMMERCIAL

## 2023-10-23 RX ORDER — METOPROLOL SUCCINATE 25 MG/1
25 TABLET, EXTENDED RELEASE ORAL DAILY
Qty: 90 TABLET | Refills: 3 | Status: SHIPPED | OUTPATIENT
Start: 2023-10-23

## 2023-10-23 NOTE — TELEPHONE ENCOUNTER
Rx Refill Note  Requested Prescriptions     Pending Prescriptions Disp Refills    metoprolol succinate XL (TOPROL-XL) 25 MG 24 hr tablet [Pharmacy Med Name: METOPROL SUC TAB 25MG ER] 90 tablet 3     Sig: TAKE 1 TABLET DAILY      Last office visit with prescribing clinician: 11/8/2022   Last telemedicine visit with prescribing clinician: Visit date not found   Next office visit with prescribing clinician: 11/10/2023                         Would you like a call back once the refill request has been completed: [] Yes [] No    If the office needs to give you a call back, can they leave a voicemail: [] Yes [] No    Hilda Washington  10/23/23, 10:30 EDT

## 2023-11-10 ENCOUNTER — OFFICE VISIT (OUTPATIENT)
Dept: INTERNAL MEDICINE | Facility: CLINIC | Age: 88
End: 2023-11-10
Payer: COMMERCIAL

## 2023-11-10 VITALS
OXYGEN SATURATION: 95 % | WEIGHT: 152 LBS | DIASTOLIC BLOOD PRESSURE: 92 MMHG | SYSTOLIC BLOOD PRESSURE: 196 MMHG | BODY MASS INDEX: 27.8 KG/M2 | HEART RATE: 88 BPM

## 2023-11-10 DIAGNOSIS — Z00.00 MEDICARE ANNUAL WELLNESS VISIT, SUBSEQUENT: ICD-10-CM

## 2023-11-10 DIAGNOSIS — I10 PRIMARY HYPERTENSION: Primary | ICD-10-CM

## 2023-11-10 DIAGNOSIS — F51.01 PRIMARY INSOMNIA: ICD-10-CM

## 2023-11-10 RX ORDER — METOPROLOL SUCCINATE 25 MG/1
25 TABLET, EXTENDED RELEASE ORAL DAILY
Qty: 90 TABLET | Refills: 3 | Status: SHIPPED | OUTPATIENT
Start: 2023-11-10

## 2023-11-10 RX ORDER — HYDROXYZINE HYDROCHLORIDE 10 MG/1
10 TABLET, FILM COATED ORAL NIGHTLY PRN
Qty: 90 TABLET | Refills: 3 | Status: SHIPPED | OUTPATIENT
Start: 2023-11-10

## 2023-11-10 RX ORDER — AMLODIPINE BESYLATE 2.5 MG/1
2.5 TABLET ORAL DAILY
Qty: 90 TABLET | Refills: 3 | Status: SHIPPED | OUTPATIENT
Start: 2023-11-10

## 2023-11-10 NOTE — PROGRESS NOTES
The ABCs of the Annual Wellness Visit  Subsequent Medicare Wellness Visit    Subjective      Sho Cole is a 89 y.o. female who presents for a Subsequent Medicare Wellness Visit.    The following portions of the patient's history were reviewed and   updated as appropriate: allergies, current medications, past family history, past medical history, past social history, past surgical history, and problem list.    Compared to one year ago, the patient feels her physical   health is the same.    Compared to one year ago, the patient feels her mental   health is the same.    Recent Hospitalizations:  She was not admitted to the hospital during the last year.       Current Medical Providers:  Patient Care Team:  Prieto Medina MD as PCP - General (Family Medicine)    Outpatient Medications Prior to Visit   Medication Sig Dispense Refill    metoprolol succinate XL (TOPROL-XL) 25 MG 24 hr tablet TAKE 1 TABLET DAILY 90 tablet 3    Multiple Vitamins-Minerals (DAILY MULTIVITAMIN PO) Take  by mouth.      polyethylene glycol (MIRALAX) 17 GM/SCOOP powder Take 17 g by mouth Daily. 527 g 11     No facility-administered medications prior to visit.       No opioid medication identified on active medication list. I have reviewed chart for other potential  high risk medication/s and harmful drug interactions in the elderly.        Aspirin is not on active medication list.  Aspirin use is not indicated based on review of current medical condition/s. Risk of harm outweighs potential benefits.  .    Patient Active Problem List   Diagnosis    Anxiety    Constipation    Slow transit constipation    Change in bowel habits    Hypertension    Medicare annual wellness visit, subsequent     Advance Care Planning   Advance Care Planning     Advance Directive is not on file.  ACP discussion was held with the patient during this visit. Patient has an advance directive (not in EMR), copy requested.     Objective    Vitals:    11/10/23 0815   BP:  "(!) 196/92   BP Location: Left arm   Patient Position: Sitting   Cuff Size: Adult   Pulse: 88   SpO2: 95%   Weight: 68.9 kg (152 lb)     Estimated body mass index is 27.8 kg/m² as calculated from the following:    Height as of 21: 157.5 cm (62\").    Weight as of this encounter: 68.9 kg (152 lb).    BMI cannot be calculated due to outdated height or weight values.  Please input a current height/weight in Vitals and re-renter BMIFOLLOWUP in Note to pull in correct documentation based on BMI range.      Does the patient have evidence of cognitive impairment?   No            HEALTH RISK ASSESSMENT    Smoking Status:  Social History     Tobacco Use   Smoking Status Former   Smokeless Tobacco Never     Alcohol Consumption:  Social History     Substance and Sexual Activity   Alcohol Use No     Fall Risk Screen:    SUNITHA Fall Risk Assessment was completed, and patient is at LOW risk for falls.Assessment completed on:11/10/2023    Depression Screenin/10/2023     8:20 AM   PHQ-2/PHQ-9 Depression Screening   Little Interest or Pleasure in Doing Things 0-->not at all   Feeling Down, Depressed or Hopeless 0-->not at all   PHQ-9: Brief Depression Severity Measure Score 0       Health Habits and Functional and Cognitive Screenin/10/2023     8:17 AM   Functional & Cognitive Status   Do you have difficulty preparing food and eating? No   Do you have difficulty bathing yourself, getting dressed or grooming yourself? No   Do you have difficulty using the toilet? No   Do you have difficulty moving around from place to place? No   Do you have trouble with steps or getting out of a bed or a chair? Yes   Current Diet Well Balanced Diet   Dental Exam Up to date   Eye Exam Up to date   Exercise (times per week) 6 times per week   Current Exercises Include Walking   Do you need help using the phone?  No   Are you deaf or do you have serious difficulty hearing?  Yes   Do you need help to go to places out of walking " distance? Yes   Do you need help shopping? No   Do you need help preparing meals?  No   Do you need help with housework?  No   Do you need help with laundry? No   Do you need help taking your medications? No   Do you need help managing money? Yes   Do you ever drive or ride in a car without wearing a seat belt? No   Have you felt unusual stress, anger or loneliness in the last month? No   Who do you live with? Alone   If you need help, do you have trouble finding someone available to you? No   Have you been bothered in the last four weeks by sexual problems? No   Do you have difficulty concentrating, remembering or making decisions? No       Age-appropriate Screening Schedule:  Refer to the list below for future screening recommendations based on patient's age, sex and/or medical conditions. Orders for these recommended tests are listed in the plan section. The patient has been provided with a written plan.    Health Maintenance   Topic Date Due    INFLUENZA VACCINE  03/31/2024 (Originally 8/1/2023)    Pneumococcal Vaccine 65+ (1 - PCV) 11/10/2024 (Originally 9/15/1987)    ZOSTER VACCINE (1 of 2) 11/20/2024 (Originally 9/15/1972)    TDAP/TD VACCINES (1 - Tdap) 11/27/2024 (Originally [birth year]/1941)    DXA SCAN  11/28/2024 (Originally 9/15/1922)    COVID-19 Vaccine  Completed    ANNUAL PHYSICAL  Discontinued                  CMS Preventative Services Quick Reference  Risk Factors Identified During Encounter:    Fall Risk-High or Moderate: Discussed Fall Prevention in the home    The above risks/problems have been discussed with the patient.  Pertinent information has been shared with the patient in the After Visit Summary.    There are no diagnoses linked to this encounter.    Follow Up:   Next Medicare Wellness visit to be scheduled in 1 year.      An After Visit Summary and PPPS were made available to the patient.

## 2023-11-10 NOTE — PROGRESS NOTES
"Chief Complaint  Medicare Wellness-subsequent    Subjective        Sho Cole presents to [facility]  History of Present Illness  Sho is a pleasant lady who is 89 and very resilient.  Appetite is good and she ambulates well without problems.    Otherwise blood pressures not controlled today but there is a lot of running around to get here.  They went to the wrong office first.    We did a Medicare wellness visit and the patient refuses any new vaccines or labs today.  She looks virtually the same as last time.  Blood pressure is elevated we discussed this at length.  Continue Toprol-XL 25 mg daily and add amlodipine 2.5 mg daily.    I refused a prescription for Valium that she taken in the past.  We will try low-dose hydroxyzine 10 mg at bedtime as needed.      Objective   Vital Signs:  BP (!) 196/92 (BP Location: Left arm, Patient Position: Sitting, Cuff Size: Adult)   Pulse 88   Wt 68.9 kg (152 lb)   SpO2 95%   BMI 27.80 kg/m²   Estimated body mass index is 27.8 kg/m² as calculated from the following:    Height as of 4/12/21: 157.5 cm (62\").    Weight as of this encounter: 68.9 kg (152 lb).       BMI cannot be calculated due to outdated height or weight values.  Please input a current height/weight in Vitals and re-renter BMIFOLLOWUP in Note to pull in correct documentation based on BMI range.      Physical Exam  Vitals reviewed.   Constitutional:       Appearance: She is well-developed.   HENT:      Head: Normocephalic and atraumatic.      Right Ear: External ear normal. Decreased hearing noted.      Left Ear: External ear normal. Decreased hearing noted.      Ears:      Comments: Hearing aids in place     Nose: Nose normal.   Eyes:      Conjunctiva/sclera: Conjunctivae normal.      Pupils: Pupils are equal, round, and reactive to light.   Neck:      Thyroid: No thyromegaly.      Vascular: No JVD.   Cardiovascular:      Rate and Rhythm: Normal rate and regular rhythm. "      Heart sounds: Normal heart sounds.   Pulmonary:      Effort: Pulmonary effort is normal.      Breath sounds: Normal breath sounds.   Abdominal:      General: Bowel sounds are normal.      Palpations: Abdomen is soft.   Musculoskeletal:         General: Normal range of motion.      Cervical back: Normal range of motion and neck supple.   Lymphadenopathy:      Cervical: No cervical adenopathy.   Skin:     General: Skin is warm and dry.      Findings: No rash.   Neurological:      Mental Status: She is alert and oriented to person, place, and time.      Cranial Nerves: No cranial nerve deficit.      Coordination: Coordination normal.   Psychiatric:         Behavior: Behavior normal.         Thought Content: Thought content normal.         Judgment: Judgment normal.        Result Review :  The following data was reviewed by: Prieto Medina MD on 11/10/2023:                 Assessment and Plan   Diagnoses and all orders for this visit:    1. Primary hypertension (Primary)  Comments:  Metoprolol XL 25 mg daily we will add amlodipine 2.5 mg daily.    2. Primary insomnia  Comments:  Did not agree to Valium Rx.  Trial of low-dose hydroxyzine 10 mg at bedtime as needed    3. Medicare annual wellness visit, subsequent    Other orders  -     metoprolol succinate XL (TOPROL-XL) 25 MG 24 hr tablet; Take 1 tablet by mouth Daily.  Dispense: 90 tablet; Refill: 3  -     amLODIPine (NORVASC) 2.5 MG tablet; Take 1 tablet by mouth Daily.  Dispense: 90 tablet; Refill: 3  -     hydrOXYzine (ATARAX) 10 MG tablet; Take 1 tablet by mouth At Night As Needed (sleep).  Dispense: 90 tablet; Refill: 3             Follow Up   No follow-ups on file.  Patient was given instructions and counseling regarding her condition or for health maintenance advice. Please see specific information pulled into the AVS if appropriate.

## 2024-11-15 ENCOUNTER — OFFICE VISIT (OUTPATIENT)
Dept: INTERNAL MEDICINE | Facility: CLINIC | Age: 89
End: 2024-11-15
Payer: COMMERCIAL

## 2024-11-15 VITALS
HEART RATE: 84 BPM | DIASTOLIC BLOOD PRESSURE: 92 MMHG | BODY MASS INDEX: 27.8 KG/M2 | WEIGHT: 152 LBS | SYSTOLIC BLOOD PRESSURE: 182 MMHG | OXYGEN SATURATION: 97 %

## 2024-11-15 DIAGNOSIS — F51.04 PSYCHOPHYSIOLOGICAL INSOMNIA: ICD-10-CM

## 2024-11-15 DIAGNOSIS — I10 PRIMARY HYPERTENSION: Primary | ICD-10-CM

## 2024-11-15 DIAGNOSIS — R20.0 BILATERAL HAND NUMBNESS: ICD-10-CM

## 2024-11-15 RX ORDER — METOPROLOL SUCCINATE 25 MG/1
25 TABLET, EXTENDED RELEASE ORAL DAILY
Qty: 90 TABLET | Refills: 3 | Status: SHIPPED | OUTPATIENT
Start: 2024-11-15

## 2024-11-15 RX ORDER — TRAZODONE HYDROCHLORIDE 50 MG/1
25 TABLET, FILM COATED ORAL NIGHTLY
Qty: 90 TABLET | Refills: 3 | Status: SHIPPED | OUTPATIENT
Start: 2024-11-15

## 2024-11-15 RX ORDER — AMLODIPINE BESYLATE 2.5 MG/1
2.5 TABLET ORAL DAILY
Qty: 90 TABLET | Refills: 3 | Status: SHIPPED | OUTPATIENT
Start: 2024-11-15

## 2024-11-15 NOTE — PATIENT INSTRUCTIONS
Medicare Wellness  Personal Prevention Plan of Service     Date of Office Visit:    Encounter Provider:  Prieto Medina MD  Place of Service:  [facility]  Patient Name: Sho Cole  :  9/15/1922    As part of the Medicare Wellness portion of your visit today, we are providing you with this personalized preventive plan of services (PPPS). This plan is based upon recommendations of the [place] Preventive Services Task Force (USPSTF) and the Advisory Committee on Immunization Practices (ACIP).    This lists the preventive care services that should be considered, and provides dates of when you are due. Items listed as completed are up-to-date and do not require any further intervention.    Health Maintenance   Topic Date Due    Pneumococcal Vaccine 65+ (1 of 1 - PCV) Never done    RSV Vaccine - Adults (1 - 1-dose 75+ series) Never done    INFLUENZA VACCINE  2024    ZOSTER VACCINE (1 of 2) 2024 (Originally 9/15/1972)    TDAP/TD VACCINES (1 - Tdap) 2024 (Originally 9/15/1941)    DXA SCAN  2024 (Originally 9/15/1922)    COVID-19 Vaccine  Completed    ANNUAL PHYSICAL  Discontinued       No orders of the defined types were placed in this encounter.      No follow-ups on file.

## 2024-11-15 NOTE — PROGRESS NOTES
Subjective   The ABCs of the Annual Wellness Visit  Medicare Wellness Visit      Sho Cole is a 89 y.o. patient who presents for a Medicare Wellness Visit.    The following portions of the patient's history were reviewed and   updated as appropriate: allergies, current medications, past family history, past medical history, past social history, past surgical history, and problem list.    Compared to one year ago, the patient's physical   health is the same.  Compared to one year ago, the patient's mental   health is the same.    Recent Hospitalizations:  She was not admitted to the hospital during the last year.     Current Medical Providers:  Patient Care Team:  Prieto Medina MD as PCP - General (Family Medicine)    Outpatient Medications Prior to Visit   Medication Sig Dispense Refill    metoprolol succinate XL (TOPROL-XL) 25 MG 24 hr tablet Take 1 tablet by mouth Daily. 90 tablet 3    Multiple Vitamins-Minerals (DAILY MULTIVITAMIN PO) Take  by mouth.      polyethylene glycol (MIRALAX) 17 GM/SCOOP powder Take 17 g by mouth Daily. 527 g 11    amLODIPine (NORVASC) 2.5 MG tablet Take 1 tablet by mouth Daily. (Patient not taking: Reported on 11/15/2024) 90 tablet 3    hydrOXYzine (ATARAX) 10 MG tablet Take 1 tablet by mouth At Night As Needed (sleep). (Patient not taking: Reported on 11/15/2024) 90 tablet 3     No facility-administered medications prior to visit.     No opioid medication identified on active medication list. I have reviewed chart for other potential  high risk medication/s and harmful drug interactions in the elderly.      Aspirin is not on active medication list.  Aspirin use is not indicated based on review of current medical condition/s. Risk of harm outweighs potential benefits.  .    Patient Active Problem List   Diagnosis    Anxiety    Constipation    Slow transit constipation    Change in bowel habits    Hypertension    Medicare annual wellness visit, subsequent     Advance Care Planning  "Advance Directive is not on file.  ACP discussion was held with the patient during this visit. Patient has an advance directive (not in EMR), copy requested.            Objective   Vitals:    11/15/24 0945   BP: (!) 182/92   BP Location: Left arm   Patient Position: Sitting   Cuff Size: Adult   Pulse: 84   SpO2: 97%   Weight: 68.9 kg (152 lb)   PainSc: 0-No pain       Estimated body mass index is 27.8 kg/m² as calculated from the following:    Height as of 21: 157.5 cm (62\").    Weight as of this encounter: 68.9 kg (152 lb).    BMI cannot be calculated due to outdated height or weight values.  Please input a current height/weight in Vitals and re-renter BMIFOLLOWUP in Note to pull in correct documentation based on BMI range.       Does the patient have evidence of cognitive impairment? No                                                                                                Health  Risk Assessment    Smoking Status:  Social History     Tobacco Use   Smoking Status Former    Types: Cigarettes   Smokeless Tobacco Never   Tobacco Comments    Pt does not remember when she started smoking but thinks she quit 30 years ago     Alcohol Consumption:  Social History     Substance and Sexual Activity   Alcohol Use No       Fall Risk Screen  STEADI Fall Risk Assessment was completed, and patient is at MODERATE risk for falls. Assessment completed on:11/15/2024    Depression Screening   Little interest or pleasure in doing things? Not at all   Feeling down, depressed, or hopeless? Not at all   PHQ-2 Total Score 0      Health Habits and Functional and Cognitive Screenin/15/2024     9:47 AM   Functional & Cognitive Status   Do you have difficulty preparing food and eating? No   Do you have difficulty bathing yourself, getting dressed or grooming yourself? No   Do you have difficulty using the toilet? No   Do you have difficulty moving around from place to place? No   Do you have trouble with steps or getting " out of a bed or a chair? No   Current Diet Well Balanced Diet   Dental Exam Up to date   Eye Exam Up to date   Exercise (times per week) 7 times per week   Current Exercises Include Walking;House Cleaning;Yard Work   Do you need help using the phone?  No   Are you deaf or do you have serious difficulty hearing?  Yes   Do you need help to go to places out of walking distance? Yes   Do you need help shopping? Yes   Do you need help preparing meals?  Yes   Do you need help with housework?  Yes   Do you need help with laundry? Yes   Do you need help taking your medications? Yes   Do you need help managing money? No   Do you ever drive or ride in a car without wearing a seat belt? No   Have you felt unusual stress, anger or loneliness in the last month? No   Who do you live with? Alone   If you need help, do you have trouble finding someone available to you? No   Have you been bothered in the last four weeks by sexual problems? No   Do you have difficulty concentrating, remembering or making decisions? Yes           Age-appropriate Screening Schedule:  Refer to the list below for future screening recommendations based on patient's age, sex and/or medical conditions. Orders for these recommended tests are listed in the plan section. The patient has been provided with a written plan.    Health Maintenance List  Health Maintenance   Topic Date Due    Pneumococcal Vaccine 65+ (1 of 1 - PCV) Never done    RSV Vaccine - Adults (1 - 1-dose 75+ series) Never done    INFLUENZA VACCINE  08/01/2024    ZOSTER VACCINE (1 of 2) 11/20/2024 (Originally 9/15/1972)    TDAP/TD VACCINES (1 - Tdap) 11/27/2024 (Originally 9/15/1941)    DXA SCAN  11/28/2024 (Originally 9/15/1922)    COVID-19 Vaccine  Completed    ANNUAL PHYSICAL  Discontinued                                                                                                                                                CMS Preventative Services Quick Reference  Risk Factors  Identified During Encounter  Fall Risk-High or Moderate: Discussed Fall Prevention in the home    The above risks/problems have been discussed with the patient.  Pertinent information has been shared with the patient in the After Visit Summary.  An After Visit Summary and PPPS were made available to the patient.    Follow Up:   Next Medicare Wellness visit to be scheduled in 1 year.         Additional E&M Note during same encounter follows:  Patient has additional, significant, and separately identifiable condition(s)/problem(s) that require work above and beyond the Medicare Wellness Visit     Chief Complaint  Medicare Wellness-subsequent    Subjective   The patient is a pleasant 89-year-old lady he used to work for the [employer].  She is chagrined to be here for any reason.  She refuses labs and immunizations.    Otherwise she appears to be in the usual state of health with elevated blood pressure which she states has been elevated forever.  She agrees to take a small dose of metoprolol and stop taking amlodipine 2.5.  Will reinitiate both those medicines.    She requested some Valium at bedtime and I have refused to do that based on decreased tolerance to Valium over time for her age.  I will try her on trazodone 50 mg half tablet at bedtime as a safest option.    She has some bilateral hand numbness she attributes to circulation.  On examination her circulation is excellent she has a ganglion cyst on the  ventral right wrist laterally and evidence of possible carpal tunnel without weakness.  Median radial ulnar nerves are intact.      Sho is also being seen today for an annual adult preventative physical exam.  and Sho is also being seen today for additional medical problem/s.    Review of Systems   Neurological:  Positive for numbness.   All other systems reviewed and are negative.             Objective   Vital Signs:  BP (!) 182/92 (BP Location: Left arm, Patient Position: Sitting, Cuff Size: Adult)    Pulse 84   Wt 68.9 kg (152 lb)   SpO2 97%   BMI 27.80 kg/m²   Physical Exam  Vitals reviewed.   Constitutional:       Appearance: She is well-developed.   HENT:      Head: Normocephalic and atraumatic.      Right Ear: Tympanic membrane and external ear normal.      Left Ear: Tympanic membrane and external ear normal.      Nose: Nose normal.   Eyes:      Conjunctiva/sclera: Conjunctivae normal.      Pupils: Pupils are equal, round, and reactive to light.   Neck:      Thyroid: No thyromegaly.      Vascular: No JVD.   Cardiovascular:      Rate and Rhythm: Normal rate and regular rhythm.      Heart sounds: Normal heart sounds.   Pulmonary:      Effort: Pulmonary effort is normal.      Breath sounds: Normal breath sounds.   Abdominal:      General: Bowel sounds are normal.      Palpations: Abdomen is soft.   Musculoskeletal:         General: Normal range of motion.      Right wrist: Swelling present.      Cervical back: Normal range of motion and neck supple.      Comments: Ganglion cyst right wrist laterally ventrally.  Median radial and ulnar nerves are intact.  No weakness   Lymphadenopathy:      Cervical: No cervical adenopathy.   Skin:     General: Skin is warm and dry.      Findings: No rash.   Neurological:      Mental Status: She is alert and oriented to person, place, and time.      Cranial Nerves: No cranial nerve deficit.      Coordination: Coordination normal.   Psychiatric:         Behavior: Behavior normal.         Thought Content: Thought content normal.         Judgment: Judgment normal.         The following data was reviewed by: Prieto Medina MD on 11/15/2024:              Assessment and Plan Additional age appropriate preventative wellness advice topics were discussed during today's preventative wellness exam(some topics already addressed during AWV portion of the note above):    Physical Activity: Advised cardiovascular activity 150 minutes per week as tolerated. (example brisk walk for 30  minutes, 5 days a week).           Primary hypertension   resubmit amlodipine 2.5 mg daily plus metoprolol XL 25 mg daily         Psychophysiological insomnia   trial of trazodone 50 mg half to 1 at bedtime as needed         Bilateral hand numbness  Appears to be mild carpal tunnel bilaterally.  She does not desire further workup.               Follow Up   No follow-ups on file.  Patient was given instructions and counseling regarding her condition or for health maintenance advice. Please see specific information pulled into the AVS if appropriate.